# Patient Record
Sex: MALE | Race: WHITE | NOT HISPANIC OR LATINO | URBAN - METROPOLITAN AREA
[De-identification: names, ages, dates, MRNs, and addresses within clinical notes are randomized per-mention and may not be internally consistent; named-entity substitution may affect disease eponyms.]

---

## 2019-06-05 ENCOUNTER — OFFICE (AMBULATORY)
Dept: URBAN - METROPOLITAN AREA PATHOLOGY 4 | Facility: PATHOLOGY | Age: 74
End: 2019-06-05

## 2019-06-05 ENCOUNTER — ON CAMPUS - OUTPATIENT (AMBULATORY)
Dept: URBAN - METROPOLITAN AREA HOSPITAL 2 | Facility: HOSPITAL | Age: 74
End: 2019-06-05
Payer: COMMERCIAL

## 2019-06-05 VITALS
WEIGHT: 283.4 LBS | DIASTOLIC BLOOD PRESSURE: 79 MMHG | DIASTOLIC BLOOD PRESSURE: 71 MMHG | OXYGEN SATURATION: 97 % | DIASTOLIC BLOOD PRESSURE: 82 MMHG | HEIGHT: 75 IN | SYSTOLIC BLOOD PRESSURE: 127 MMHG | SYSTOLIC BLOOD PRESSURE: 118 MMHG | DIASTOLIC BLOOD PRESSURE: 63 MMHG | HEART RATE: 54 BPM | OXYGEN SATURATION: 98 % | RESPIRATION RATE: 16 BRPM | OXYGEN SATURATION: 99 % | HEART RATE: 50 BPM | SYSTOLIC BLOOD PRESSURE: 146 MMHG | DIASTOLIC BLOOD PRESSURE: 106 MMHG | OXYGEN SATURATION: 95 % | DIASTOLIC BLOOD PRESSURE: 64 MMHG | SYSTOLIC BLOOD PRESSURE: 154 MMHG | HEART RATE: 52 BPM | HEART RATE: 65 BPM | SYSTOLIC BLOOD PRESSURE: 129 MMHG | SYSTOLIC BLOOD PRESSURE: 113 MMHG | SYSTOLIC BLOOD PRESSURE: 122 MMHG | HEART RATE: 62 BPM | TEMPERATURE: 97 F | SYSTOLIC BLOOD PRESSURE: 151 MMHG | DIASTOLIC BLOOD PRESSURE: 66 MMHG | DIASTOLIC BLOOD PRESSURE: 90 MMHG | HEART RATE: 47 BPM | OXYGEN SATURATION: 100 % | DIASTOLIC BLOOD PRESSURE: 68 MMHG | HEART RATE: 68 BPM | HEART RATE: 56 BPM | HEART RATE: 80 BPM | SYSTOLIC BLOOD PRESSURE: 111 MMHG | RESPIRATION RATE: 18 BRPM

## 2019-06-05 DIAGNOSIS — K64.1 SECOND DEGREE HEMORRHOIDS: ICD-10-CM

## 2019-06-05 DIAGNOSIS — K21.0 GASTRO-ESOPHAGEAL REFLUX DISEASE WITH ESOPHAGITIS: ICD-10-CM

## 2019-06-05 DIAGNOSIS — Z86.010 PERSONAL HISTORY OF COLONIC POLYPS: ICD-10-CM

## 2019-06-05 DIAGNOSIS — D12.2 BENIGN NEOPLASM OF ASCENDING COLON: ICD-10-CM

## 2019-06-05 DIAGNOSIS — K44.9 DIAPHRAGMATIC HERNIA WITHOUT OBSTRUCTION OR GANGRENE: ICD-10-CM

## 2019-06-05 DIAGNOSIS — R13.10 DYSPHAGIA, UNSPECIFIED: ICD-10-CM

## 2019-06-05 PROBLEM — K22.70 BARRETT'S ESOPHAGUS WITHOUT DYSPLASIA: Status: ACTIVE | Noted: 2019-06-05

## 2019-06-05 LAB
GI HISTOLOGY: A. SELECT: (no result)
GI HISTOLOGY: B. UNSPECIFIED: (no result)
GI HISTOLOGY: PDF REPORT: (no result)

## 2019-06-05 PROCEDURE — 43239 EGD BIOPSY SINGLE/MULTIPLE: CPT | Mod: 59 | Performed by: INTERNAL MEDICINE

## 2019-06-05 PROCEDURE — 45385 COLONOSCOPY W/LESION REMOVAL: CPT | Mod: PT | Performed by: INTERNAL MEDICINE

## 2019-06-05 PROCEDURE — 43450 DILATE ESOPHAGUS 1/MULT PASS: CPT | Performed by: INTERNAL MEDICINE

## 2019-06-05 PROCEDURE — 88305 TISSUE EXAM BY PATHOLOGIST: CPT | Performed by: INTERNAL MEDICINE

## 2019-06-05 RX ORDER — PANTOPRAZOLE SODIUM 40 MG/1
40 TABLET, DELAYED RELEASE ORAL
Qty: 90 | Refills: 3 | Status: ACTIVE
Start: 2019-06-05

## 2019-06-27 ENCOUNTER — OFFICE (AMBULATORY)
Dept: URBAN - METROPOLITAN AREA CLINIC 64 | Facility: CLINIC | Age: 74
End: 2019-06-27

## 2019-06-27 VITALS
SYSTOLIC BLOOD PRESSURE: 126 MMHG | HEIGHT: 75 IN | DIASTOLIC BLOOD PRESSURE: 66 MMHG | WEIGHT: 289 LBS | HEART RATE: 60 BPM

## 2019-06-27 DIAGNOSIS — R13.10 DYSPHAGIA, UNSPECIFIED: ICD-10-CM

## 2019-06-27 PROCEDURE — 99213 OFFICE O/P EST LOW 20 MIN: CPT | Performed by: NURSE PRACTITIONER

## 2020-01-27 ENCOUNTER — HOSPITAL ENCOUNTER (OUTPATIENT)
Dept: GENERAL RADIOLOGY | Facility: HOSPITAL | Age: 75
Discharge: HOME | End: 2020-01-27
Admitting: UROLOGY

## 2020-01-27 ENCOUNTER — TRANSCRIBE ORDERS (OUTPATIENT)
Dept: ADMINISTRATIVE | Facility: HOSPITAL | Age: 75
End: 2020-01-27

## 2020-01-27 DIAGNOSIS — N20.0 CALCULUS, RENAL: ICD-10-CM

## 2020-01-27 DIAGNOSIS — N20.0 CALCULUS, RENAL: Primary | ICD-10-CM

## 2020-01-27 PROCEDURE — 74018 RADEX ABDOMEN 1 VIEW: CPT

## 2024-01-26 ENCOUNTER — LAB (OUTPATIENT)
Dept: LAB | Facility: HOSPITAL | Age: 79
End: 2024-01-26
Payer: MEDICARE

## 2024-01-26 ENCOUNTER — TRANSCRIBE ORDERS (OUTPATIENT)
Dept: ADMINISTRATIVE | Facility: HOSPITAL | Age: 79
End: 2024-01-26
Payer: MEDICARE

## 2024-01-26 DIAGNOSIS — R73.09 ABNORMAL GLUCOSE: Primary | ICD-10-CM

## 2024-01-26 DIAGNOSIS — R73.09 ABNORMAL GLUCOSE: ICD-10-CM

## 2024-01-26 LAB — HBA1C MFR BLD: 9.6 % (ref 4.8–5.6)

## 2024-01-26 PROCEDURE — 83036 HEMOGLOBIN GLYCOSYLATED A1C: CPT

## 2024-01-26 PROCEDURE — 36415 COLL VENOUS BLD VENIPUNCTURE: CPT

## 2025-01-14 ENCOUNTER — PRE-ADMISSION TESTING (OUTPATIENT)
Dept: PREADMISSION TESTING | Facility: HOSPITAL | Age: 80
End: 2025-01-14
Payer: MEDICARE

## 2025-01-14 ENCOUNTER — HOSPITAL ENCOUNTER (OUTPATIENT)
Dept: GENERAL RADIOLOGY | Facility: HOSPITAL | Age: 80
Discharge: HOME | End: 2025-01-14
Payer: MEDICARE

## 2025-01-14 VITALS
SYSTOLIC BLOOD PRESSURE: 125 MMHG | HEIGHT: 73 IN | WEIGHT: 271 LBS | BODY MASS INDEX: 35.92 KG/M2 | HEART RATE: 81 BPM | DIASTOLIC BLOOD PRESSURE: 74 MMHG | TEMPERATURE: 97 F | OXYGEN SATURATION: 97 % | RESPIRATION RATE: 18 BRPM

## 2025-01-14 LAB
ABO GROUP BLD: NORMAL
ALBUMIN SERPL-MCNC: 4.3 G/DL (ref 3.5–5.2)
ALBUMIN/GLOB SERPL: 1.2 G/DL
ALP SERPL-CCNC: 111 U/L (ref 39–117)
ALT SERPL W P-5'-P-CCNC: 35 U/L (ref 1–41)
ANION GAP SERPL CALCULATED.3IONS-SCNC: 14.7 MMOL/L (ref 5–15)
AST SERPL-CCNC: 28 U/L (ref 1–40)
BILIRUB SERPL-MCNC: 0.4 MG/DL (ref 0–1.2)
BLD GP AB SCN SERPL QL: NEGATIVE
BUN SERPL-MCNC: 28 MG/DL (ref 8–23)
BUN/CREAT SERPL: 18.8 (ref 7–25)
CALCIUM SPEC-SCNC: 9.6 MG/DL (ref 8.6–10.5)
CHLORIDE SERPL-SCNC: 99 MMOL/L (ref 98–107)
CO2 SERPL-SCNC: 25.3 MMOL/L (ref 22–29)
CREAT SERPL-MCNC: 1.49 MG/DL (ref 0.76–1.27)
CRP SERPL-MCNC: 0.53 MG/DL (ref 0–0.5)
DEPRECATED RDW RBC AUTO: 40.3 FL (ref 37–54)
EGFRCR SERPLBLD CKD-EPI 2021: 47.4 ML/MIN/1.73
ERYTHROCYTE [DISTWIDTH] IN BLOOD BY AUTOMATED COUNT: 13 % (ref 12.3–15.4)
ERYTHROCYTE [SEDIMENTATION RATE] IN BLOOD: 35 MM/HR (ref 0–20)
GLOBULIN UR ELPH-MCNC: 3.5 GM/DL
GLUCOSE SERPL-MCNC: 241 MG/DL (ref 65–99)
HBA1C MFR BLD: 8.4 % (ref 4.8–5.6)
HCT VFR BLD AUTO: 43.9 % (ref 37.5–51)
HGB BLD-MCNC: 13.6 G/DL (ref 13–17.7)
INR PPP: 0.98 (ref 0.9–1.1)
MCH RBC QN AUTO: 26.3 PG (ref 26.6–33)
MCHC RBC AUTO-ENTMCNC: 31 G/DL (ref 31.5–35.7)
MCV RBC AUTO: 84.7 FL (ref 79–97)
PLATELET # BLD AUTO: 214 10*3/MM3 (ref 140–450)
PMV BLD AUTO: 12.2 FL (ref 6–12)
POTASSIUM SERPL-SCNC: 4.7 MMOL/L (ref 3.5–5.2)
PROT SERPL-MCNC: 7.8 G/DL (ref 6–8.5)
PROTHROMBIN TIME: 13.2 SECONDS (ref 11.7–14.2)
QT INTERVAL: 414 MS
QTC INTERVAL: 472 MS
RBC # BLD AUTO: 5.18 10*6/MM3 (ref 4.14–5.8)
RH BLD: POSITIVE
SODIUM SERPL-SCNC: 139 MMOL/L (ref 136–145)
T&S EXPIRATION DATE: NORMAL
WBC NRBC COR # BLD AUTO: 10.81 10*3/MM3 (ref 3.4–10.8)

## 2025-01-14 PROCEDURE — 93005 ELECTROCARDIOGRAM TRACING: CPT

## 2025-01-14 PROCEDURE — 73560 X-RAY EXAM OF KNEE 1 OR 2: CPT

## 2025-01-14 PROCEDURE — 86900 BLOOD TYPING SEROLOGIC ABO: CPT

## 2025-01-14 PROCEDURE — 86901 BLOOD TYPING SEROLOGIC RH(D): CPT

## 2025-01-14 PROCEDURE — 85610 PROTHROMBIN TIME: CPT

## 2025-01-14 PROCEDURE — 85652 RBC SED RATE AUTOMATED: CPT

## 2025-01-14 PROCEDURE — 83036 HEMOGLOBIN GLYCOSYLATED A1C: CPT

## 2025-01-14 PROCEDURE — 86140 C-REACTIVE PROTEIN: CPT

## 2025-01-14 PROCEDURE — 93010 ELECTROCARDIOGRAM REPORT: CPT | Performed by: INTERNAL MEDICINE

## 2025-01-14 PROCEDURE — 36415 COLL VENOUS BLD VENIPUNCTURE: CPT

## 2025-01-14 PROCEDURE — 86850 RBC ANTIBODY SCREEN: CPT

## 2025-01-14 PROCEDURE — 85027 COMPLETE CBC AUTOMATED: CPT

## 2025-01-14 PROCEDURE — 80053 COMPREHEN METABOLIC PANEL: CPT

## 2025-01-14 PROCEDURE — 71046 X-RAY EXAM CHEST 2 VIEWS: CPT

## 2025-01-14 RX ORDER — PANTOPRAZOLE SODIUM 40 MG/1
1 TABLET, DELAYED RELEASE ORAL DAILY
COMMUNITY
Start: 2024-12-16

## 2025-01-14 RX ORDER — METOPROLOL SUCCINATE 100 MG/1
0.5 TABLET, EXTENDED RELEASE ORAL DAILY
COMMUNITY
Start: 2020-01-01

## 2025-01-14 RX ORDER — LISINOPRIL 10 MG/1
0.5 TABLET ORAL NIGHTLY
COMMUNITY
Start: 2024-10-21

## 2025-01-14 RX ORDER — FEBUXOSTAT 40 MG/1
1 TABLET, FILM COATED ORAL EVERY OTHER DAY
COMMUNITY
Start: 2022-01-01

## 2025-01-14 RX ORDER — GLIMEPIRIDE 2 MG/1
1 TABLET ORAL
COMMUNITY
Start: 2020-01-01

## 2025-01-14 RX ORDER — ATORVASTATIN CALCIUM 20 MG/1
1 TABLET, FILM COATED ORAL DAILY
COMMUNITY
Start: 2024-10-28

## 2025-01-14 RX ORDER — GLIMEPIRIDE 2 MG/1
2 TABLET ORAL
COMMUNITY
Start: 2020-01-01

## 2025-01-14 RX ORDER — LEVOCETIRIZINE DIHYDROCHLORIDE 5 MG/1
1 TABLET, FILM COATED ORAL EVERY EVENING
COMMUNITY
Start: 2025-01-14

## 2025-01-14 RX ORDER — AZELASTINE HYDROCHLORIDE 137 UG/1
1 SPRAY, METERED NASAL EVERY MORNING
COMMUNITY
Start: 2025-01-14

## 2025-01-14 RX ORDER — FLUTICASONE PROPIONATE 50 MCG
2 SPRAY, SUSPENSION (ML) NASAL
COMMUNITY
Start: 2025-01-14

## 2025-01-14 NOTE — DISCHARGE INSTRUCTIONS
Take the following medications the morning of surgery:      PANTOPRAZOLE AND METOPROLOL    If you are on prescription narcotic pain medication to control your pain you may also take that medication the morning of surgery.      General Instructions:     Do not eat solid food after midnight the night before surgery.  Clear liquids day of surgery are allowed but must be stopped at least two hours before your hospital arrival time.       Allowed clear liquids      Water, sodas, and tea or coffee with no cream or milk added.       12 to 20 ounces of a clear liquid that contains carbohydrates is recommended.  If non-diabetic, have Gatorade or Powerade.  If diabetic, have G2 or Powerade Zero.     Do not have liquids red in color.  Do not consume chicken, beef, pork or vegetable broth or bouillon cubes of any variety as they are not considered clear liquids and are not allowed.      Infants may have breast milk up to four hours before surgery.  Infants drinking formula may drink formula up to six hours before surgery.   Patients who avoid smoking, chewing tobacco and alcohol for 4 weeks prior to surgery have a reduced risk of post-operative complications.  Quit smoking as many days before surgery as you can.  Do not smoke, use chewing tobacco or drink alcohol the day of surgery.   If applicable bring your C-PAP/ BI-PAP machine in with you to preop day of surgery.  Bring any papers given to you in the doctor’s office.  Wear clean comfortable clothes.  Do not wear contact lenses, false eyelashes or make-up.  Bring a case for your glasses.   Bring crutches or walker if applicable.  Remove all piercings.  Leave jewelry and any other valuables at home.  Hair extensions with metal clips must be removed prior to surgery.  The Pre-Admission Testing nurse will instruct you to bring medications if unable to obtain an accurate list in Pre-Admission Testing.        If you were given a blood bank ID arm band remember to bring it with  you the day of surgery.    Preventing a Surgical Site Infection:  For 2 to 3 days before surgery, avoid shaving with a razor because the razor can irritate skin and make it easier to develop an infection.    Any areas of open skin can increase the risk of a post-operative wound infection by allowing bacteria to enter and travel throughout the body.  Notify your surgeon if you have any skin wounds / rashes even if it is not near the expected surgical site.  The area will need assessed to determine if surgery should be delayed until it is healed.  The night prior to surgery shower using a fresh bar of anti-bacterial soap (such as Dial) and clean washcloth.  Sleep in a clean bed with clean clothing.  Do not allow pets to sleep with you.  Shower on the morning of surgery using a fresh bar of anti-bacterial soap (such as Dial) and clean washcloth.  Dry with a clean towel and dress in clean clothing.  Ask your surgeon if you will be receiving antibiotics prior to surgery.  Make sure you, your family, and all healthcare providers clean their hands with soap and water or an alcohol based hand  before caring for you or your wound.    Day of surgery:  Your arrival time is approximately two hours before your scheduled surgery time.  Please note if you have an early arrival time the surgery doors do not open before 5:00 AM.  Upon arrival, a Pre-op nurse and Anesthesiologist will review your health history, obtain vital signs, and answer questions you may have.  The only belongings needed at this time will be a list of your home medications and if applicable your C-PAP/BI-PAP machine.  A Pre-op nurse will start an IV and you may receive medication in preparation for surgery, including something to help you relax.     Please be aware that surgery does come with discomfort.  We want to make every effort to control your discomfort so please discuss any uncontrolled symptoms with your nurse.   Your doctor will most likely  have prescribed pain medications.      If you are going home after surgery you will receive individualized written care instructions before being discharged.  A responsible adult must drive you to and from the hospital on the day of your surgery and ideally stay with you through the night.   .  Discharge prescriptions can be filled by the hospital pharmacy during regular pharmacy hours.  If you are having surgery late in the day/evening your prescription may be e-prescribed to your pharmacy.  Please verify your pharmacy hours or chose a 24 hour pharmacy to avoid not having access to your prescription because your pharmacy has closed for the day.    If you are staying overnight following surgery, you will be transported to your hospital room following the recovery period.  [facility] has all private rooms.    If you have any questions please call Pre-Admission Testing at [phone].  Deductibles and co-payments are collected on the day of service. Please be prepared to pay the required co-pay, deductible or deposit on the day of service as defined by your plan.    Call your surgeon immediately if you experience any of the following symptoms:  Sore Throat  Shortness of Breath or difficulty breathing  Cough  Chills  Body soreness or muscle pain  Headache  Fever  New loss of taste or smell  Do not arrive for your surgery ill.  Your procedure will need to be rescheduled to another time.  You will need to call your physician before the day of surgery to avoid any unnecessary exposure to hospital staff as well as other patients.  CHLORHEXIDINE CLOTH INSTRUCTIONS  The morning of surgery follow these instructions using the Chlorhexidine cloths you've been given.  These steps reduce bacteria on the body.  Do not use the cloths near your eyes, ears mouth, genitalia or on open wounds.  Throw the cloths away after use but do not try to flush them down a toilet.      Open and remove one cloth at a time from the  package.    Leave the cloth unfolded and begin the bathing.  Massage the skin with the cloths using gentle pressure to remove bacteria.  Do not scrub harshly.   Follow the steps below with one 2% CHG cloth per area (6 total cloths).  One cloth for neck, shoulders and chest.  One cloth for both arms, hands, fingers and underarms (do underarms last).  One cloth for the abdomen followed by groin.  One cloth for right leg and foot including between the toes.  One cloth for left leg and foot including between the toes.  The last cloth is to be used for the back of the neck, back and buttocks.    Allow the CHG to air dry 3 minutes on the skin which will give it time to work and decrease the chance of irritation.  The skin may feel sticky until it is dry.  Do not rinse with water or any other liquid or you will lose the beneficial effects of the CHG.  If mild skin irritation occurs, do rinse the skin to remove the CHG.  Report this to the nurse at time of admission.  Do not apply lotions, creams, ointments, deodorants or perfumes after using the clothes. Dress in clean clothes before coming to the hospital.

## 2025-01-23 ENCOUNTER — HOSPITAL ENCOUNTER (INPATIENT)
Facility: HOSPITAL | Age: 80
LOS: 1 days | Discharge: HOME | End: 2025-01-24
Attending: ORTHOPAEDIC SURGERY | Admitting: ORTHOPAEDIC SURGERY
Payer: MEDICARE

## 2025-01-23 ENCOUNTER — APPOINTMENT (OUTPATIENT)
Dept: GENERAL RADIOLOGY | Facility: HOSPITAL | Age: 80
End: 2025-01-23
Payer: MEDICARE

## 2025-01-23 ENCOUNTER — ANESTHESIA (OUTPATIENT)
Dept: PERIOP | Facility: HOSPITAL | Age: 80
End: 2025-01-23
Payer: MEDICARE

## 2025-01-23 ENCOUNTER — ANESTHESIA EVENT (OUTPATIENT)
Dept: PERIOP | Facility: HOSPITAL | Age: 80
End: 2025-01-23
Payer: MEDICARE

## 2025-01-23 PROBLEM — Z96.659 STATUS POST KNEE REPLACEMENT: Status: ACTIVE | Noted: 2025-01-23

## 2025-01-23 LAB
GLUCOSE BLDC GLUCOMTR-MCNC: 157 MG/DL (ref 70–130)
GLUCOSE BLDC GLUCOMTR-MCNC: 169 MG/DL (ref 70–130)

## 2025-01-23 PROCEDURE — 25810000003 SODIUM CHLORIDE 0.9 % SOLUTION: Performed by: ORTHOPAEDIC SURGERY

## 2025-01-23 PROCEDURE — 25010000002 ONDANSETRON PER 1 MG: Performed by: NURSE ANESTHETIST, CERTIFIED REGISTERED

## 2025-01-23 PROCEDURE — 25010000002 ROPIVACAINE PER 1 MG: Performed by: ORTHOPAEDIC SURGERY

## 2025-01-23 PROCEDURE — 25010000002 LIDOCAINE PF 2% 2 % SOLUTION: Performed by: NURSE ANESTHETIST, CERTIFIED REGISTERED

## 2025-01-23 PROCEDURE — 25010000002 FENTANYL CITRATE (PF) 50 MCG/ML SOLUTION: Performed by: NURSE ANESTHETIST, CERTIFIED REGISTERED

## 2025-01-23 PROCEDURE — 25010000002 VANCOMYCIN 5 G RECONSTITUTED SOLUTION: Performed by: ORTHOPAEDIC SURGERY

## 2025-01-23 PROCEDURE — C1776 JOINT DEVICE (IMPLANTABLE): HCPCS | Performed by: ORTHOPAEDIC SURGERY

## 2025-01-23 PROCEDURE — 25010000002 SUGAMMADEX 200 MG/2ML SOLUTION: Performed by: NURSE ANESTHETIST, CERTIFIED REGISTERED

## 2025-01-23 PROCEDURE — 25010000002 CLINDAMYCIN 900 MG/50ML SOLUTION: Performed by: ORTHOPAEDIC SURGERY

## 2025-01-23 PROCEDURE — 25010000002 VANCOMYCIN 1 G RECONSTITUTED SOLUTION: Performed by: ORTHOPAEDIC SURGERY

## 2025-01-23 PROCEDURE — 82948 REAGENT STRIP/BLOOD GLUCOSE: CPT

## 2025-01-23 PROCEDURE — 0SRD069 REPLACEMENT OF LEFT KNEE JOINT WITH OXIDIZED ZIRCONIUM ON POLYETHYLENE SYNTHETIC SUBSTITUTE, CEMENTED, OPEN APPROACH: ICD-10-PCS | Performed by: ORTHOPAEDIC SURGERY

## 2025-01-23 PROCEDURE — 25010000002 DEXAMETHASONE SODIUM PHOSPHATE 20 MG/5ML SOLUTION: Performed by: NURSE ANESTHETIST, CERTIFIED REGISTERED

## 2025-01-23 PROCEDURE — 25010000002 CLONIDINE PER 1 MG: Performed by: ORTHOPAEDIC SURGERY

## 2025-01-23 PROCEDURE — 0SPD0JZ REMOVAL OF SYNTHETIC SUBSTITUTE FROM LEFT KNEE JOINT, OPEN APPROACH: ICD-10-PCS | Performed by: ORTHOPAEDIC SURGERY

## 2025-01-23 PROCEDURE — 25010000002 CEFAZOLIN PER 500 MG: Performed by: ORTHOPAEDIC SURGERY

## 2025-01-23 PROCEDURE — 73560 X-RAY EXAM OF KNEE 1 OR 2: CPT

## 2025-01-23 PROCEDURE — 25010000002 MAGNESIUM SULFATE PER 500 MG OF MAGNESIUM: Performed by: NURSE ANESTHETIST, CERTIFIED REGISTERED

## 2025-01-23 PROCEDURE — 25010000002 HYDROMORPHONE 1 MG/ML SOLUTION: Performed by: NURSE ANESTHETIST, CERTIFIED REGISTERED

## 2025-01-23 PROCEDURE — 25010000002 EPINEPHRINE 1 MG/ML SOLUTION 30 ML VIAL: Performed by: ORTHOPAEDIC SURGERY

## 2025-01-23 PROCEDURE — 25010000002 HYDROMORPHONE PER 4 MG: Performed by: NURSE ANESTHETIST, CERTIFIED REGISTERED

## 2025-01-23 PROCEDURE — 25810000003 LACTATED RINGERS PER 1000 ML: Performed by: STUDENT IN AN ORGANIZED HEALTH CARE EDUCATION/TRAINING PROGRAM

## 2025-01-23 PROCEDURE — C1713 ANCHOR/SCREW BN/BN,TIS/BN: HCPCS | Performed by: ORTHOPAEDIC SURGERY

## 2025-01-23 PROCEDURE — 25010000002 PROPOFOL 200 MG/20ML EMULSION: Performed by: NURSE ANESTHETIST, CERTIFIED REGISTERED

## 2025-01-23 DEVICE — VIOLET ANTIBACTERIAL POLYDIOXANONE, KNOTLESS TISSUE CONTROL DEVICE
Type: IMPLANTABLE DEVICE | Site: KNEE | Status: FUNCTIONAL
Brand: STRATAFIX

## 2025-01-23 DEVICE — JOURNEY II BCS XLPE ARTICULAR                                    INSERT SIZE 5-6 LEFT 10MM
Type: IMPLANTABLE DEVICE | Site: KNEE | Status: FUNCTIONAL
Brand: JOURNEY

## 2025-01-23 DEVICE — JOURNEY 7.5 ROUND RESURF PAT 35MM STANDARD
Type: IMPLANTABLE DEVICE | Site: KNEE | Status: FUNCTIONAL
Brand: JOURNEY

## 2025-01-23 DEVICE — KNOTLESS TISSUE CONTROL DEVICE, UNDYED UNIDIRECTIONAL (ANTIBACTERIAL) SYNTHETIC ABSORBABLE DEVICE
Type: IMPLANTABLE DEVICE | Site: KNEE | Status: FUNCTIONAL
Brand: STRATAFIX

## 2025-01-23 DEVICE — LEGION REVISION / HINGE HEMI                                    STEPPED TIBIAL WEDGE SIZE 5-6 LEFT                                    LATERAL/RIGHT MEDIAL 5MM
Type: IMPLANTABLE DEVICE | Site: KNEE | Status: FUNCTIONAL
Brand: LEGION

## 2025-01-23 DEVICE — LEGION RK/HK HEMI SZ 5-6 LTMD/RTLA 5MM
Type: IMPLANTABLE DEVICE | Site: KNEE | Status: FUNCTIONAL
Brand: LEGION

## 2025-01-23 DEVICE — PALACOS® R IS A FAST-CURING, RADIOPAQUE, POLY(METHYL METHACRYLATE)-BASED BONE CEMENT.PALACOS ® R CONTAINS THE X-RAY CONTRAST MEDIUM ZIRCONIUM DIOXIDE. TO IMPROVE VISIBILITY IN THE SURGICAL FIELD PALACOS ® R HAS BEEN COLOURED WITH CHLOROPHYLL (E141). THE BONE CEMENT IS PREPARED DIRECTLY BEFORE USE BY MIXING A POLYMER POWDER COMPONENT WITH A LIQUID MONOMER COMPONENT. A DUCTILE DOUGH FORMS WHICH CURES WITHIN A FEW MINUTES.
Type: IMPLANTABLE DEVICE | Site: KNEE | Status: FUNCTIONAL
Brand: PALACOS®

## 2025-01-23 DEVICE — LEGION TIB  BASE W/JOURNEY LOCK                                    DETAIL SZ 5 LT
Type: IMPLANTABLE DEVICE | Site: KNEE | Status: FUNCTIONAL
Brand: LEGION

## 2025-01-23 DEVICE — LEGION PRESSFIT STEM 12MM X 120MM
Type: IMPLANTABLE DEVICE | Site: KNEE | Status: FUNCTIONAL
Brand: LEGION

## 2025-01-23 DEVICE — JOURNEY II BCS FEMORAL OXINIUM                                    LEFT SIZE 5
Type: IMPLANTABLE DEVICE | Site: KNEE | Status: FUNCTIONAL
Brand: JOURNEY

## 2025-01-23 RX ORDER — MELOXICAM 15 MG/1
15 TABLET ORAL DAILY
Status: DISCONTINUED | OUTPATIENT
Start: 2025-01-23 | End: 2025-01-24 | Stop reason: HOSPADM

## 2025-01-23 RX ORDER — ROCURONIUM BROMIDE 10 MG/ML
INJECTION, SOLUTION INTRAVENOUS AS NEEDED
Status: DISCONTINUED | OUTPATIENT
Start: 2025-01-23 | End: 2025-01-23 | Stop reason: SURG

## 2025-01-23 RX ORDER — HYDROCODONE BITARTRATE AND ACETAMINOPHEN 5; 325 MG/1; MG/1
1 TABLET ORAL ONCE AS NEEDED
Status: DISCONTINUED | OUTPATIENT
Start: 2025-01-23 | End: 2025-01-23 | Stop reason: HOSPADM

## 2025-01-23 RX ORDER — IPRATROPIUM BROMIDE AND ALBUTEROL SULFATE 2.5; .5 MG/3ML; MG/3ML
3 SOLUTION RESPIRATORY (INHALATION) ONCE AS NEEDED
Status: DISCONTINUED | OUTPATIENT
Start: 2025-01-23 | End: 2025-01-23 | Stop reason: HOSPADM

## 2025-01-23 RX ORDER — PROMETHAZINE HYDROCHLORIDE 25 MG/1
25 SUPPOSITORY RECTAL ONCE AS NEEDED
Status: DISCONTINUED | OUTPATIENT
Start: 2025-01-23 | End: 2025-01-23 | Stop reason: HOSPADM

## 2025-01-23 RX ORDER — PROMETHAZINE HYDROCHLORIDE 25 MG/1
25 TABLET ORAL ONCE AS NEEDED
Status: DISCONTINUED | OUTPATIENT
Start: 2025-01-23 | End: 2025-01-23 | Stop reason: HOSPADM

## 2025-01-23 RX ORDER — LIDOCAINE HYDROCHLORIDE 20 MG/ML
INJECTION, SOLUTION EPIDURAL; INFILTRATION; INTRACAUDAL; PERINEURAL AS NEEDED
Status: DISCONTINUED | OUTPATIENT
Start: 2025-01-23 | End: 2025-01-23 | Stop reason: SURG

## 2025-01-23 RX ORDER — FENTANYL CITRATE 50 UG/ML
25 INJECTION, SOLUTION INTRAMUSCULAR; INTRAVENOUS
Status: DISCONTINUED | OUTPATIENT
Start: 2025-01-23 | End: 2025-01-23 | Stop reason: HOSPADM

## 2025-01-23 RX ORDER — NALOXONE HCL 0.4 MG/ML
0.2 VIAL (ML) INJECTION AS NEEDED
Status: DISCONTINUED | OUTPATIENT
Start: 2025-01-23 | End: 2025-01-23 | Stop reason: HOSPADM

## 2025-01-23 RX ORDER — ACETAMINOPHEN 500 MG
1000 TABLET ORAL EVERY 6 HOURS
Status: DISCONTINUED | OUTPATIENT
Start: 2025-01-23 | End: 2025-01-24 | Stop reason: HOSPADM

## 2025-01-23 RX ORDER — OXYCODONE HYDROCHLORIDE 5 MG/1
10 TABLET ORAL EVERY 4 HOURS PRN
Status: DISCONTINUED | OUTPATIENT
Start: 2025-01-23 | End: 2025-01-24 | Stop reason: HOSPADM

## 2025-01-23 RX ORDER — DIPHENHYDRAMINE HCL 25 MG
50 CAPSULE ORAL EVERY 6 HOURS PRN
Status: DISCONTINUED | OUTPATIENT
Start: 2025-01-23 | End: 2025-01-24 | Stop reason: HOSPADM

## 2025-01-23 RX ORDER — DEXAMETHASONE SODIUM PHOSPHATE 4 MG/ML
INJECTION, SOLUTION INTRA-ARTICULAR; INTRALESIONAL; INTRAMUSCULAR; INTRAVENOUS; SOFT TISSUE AS NEEDED
Status: DISCONTINUED | OUTPATIENT
Start: 2025-01-23 | End: 2025-01-23 | Stop reason: SURG

## 2025-01-23 RX ORDER — SODIUM CHLORIDE, SODIUM LACTATE, POTASSIUM CHLORIDE, CALCIUM CHLORIDE 600; 310; 30; 20 MG/100ML; MG/100ML; MG/100ML; MG/100ML
9 INJECTION, SOLUTION INTRAVENOUS CONTINUOUS
Status: ACTIVE | OUTPATIENT
Start: 2025-01-23 | End: 2025-01-24

## 2025-01-23 RX ORDER — OXYCODONE HYDROCHLORIDE 5 MG/1
5 TABLET ORAL EVERY 4 HOURS PRN
Status: DISCONTINUED | OUTPATIENT
Start: 2025-01-23 | End: 2025-01-24 | Stop reason: HOSPADM

## 2025-01-23 RX ORDER — EPHEDRINE SULFATE 50 MG/ML
5 INJECTION, SOLUTION INTRAVENOUS ONCE AS NEEDED
Status: DISCONTINUED | OUTPATIENT
Start: 2025-01-23 | End: 2025-01-23 | Stop reason: HOSPADM

## 2025-01-23 RX ORDER — ONDANSETRON 2 MG/ML
INJECTION INTRAMUSCULAR; INTRAVENOUS AS NEEDED
Status: DISCONTINUED | OUTPATIENT
Start: 2025-01-23 | End: 2025-01-23 | Stop reason: SURG

## 2025-01-23 RX ORDER — SODIUM CHLORIDE 0.9 % (FLUSH) 0.9 %
3-10 SYRINGE (ML) INJECTION AS NEEDED
Status: DISCONTINUED | OUTPATIENT
Start: 2025-01-23 | End: 2025-01-23 | Stop reason: HOSPADM

## 2025-01-23 RX ORDER — LISINOPRIL 10 MG/1
5 TABLET ORAL NIGHTLY
Status: DISCONTINUED | OUTPATIENT
Start: 2025-01-23 | End: 2025-01-24 | Stop reason: HOSPADM

## 2025-01-23 RX ORDER — DIPHENHYDRAMINE HYDROCHLORIDE 50 MG/ML
25 INJECTION INTRAMUSCULAR; INTRAVENOUS EVERY 6 HOURS PRN
Status: DISCONTINUED | OUTPATIENT
Start: 2025-01-23 | End: 2025-01-24 | Stop reason: HOSPADM

## 2025-01-23 RX ORDER — MAGNESIUM SULFATE HEPTAHYDRATE 500 MG/ML
INJECTION, SOLUTION INTRAMUSCULAR; INTRAVENOUS AS NEEDED
Status: DISCONTINUED | OUTPATIENT
Start: 2025-01-23 | End: 2025-01-23 | Stop reason: SURG

## 2025-01-23 RX ORDER — METOPROLOL SUCCINATE 50 MG/1
50 TABLET, EXTENDED RELEASE ORAL DAILY
Status: DISCONTINUED | OUTPATIENT
Start: 2025-01-24 | End: 2025-01-24 | Stop reason: HOSPADM

## 2025-01-23 RX ORDER — FLUMAZENIL 0.1 MG/ML
0.2 INJECTION INTRAVENOUS AS NEEDED
Status: DISCONTINUED | OUTPATIENT
Start: 2025-01-23 | End: 2025-01-23 | Stop reason: HOSPADM

## 2025-01-23 RX ORDER — KETAMINE HCL IN NACL, ISO-OSM 100MG/10ML
SYRINGE (ML) INJECTION AS NEEDED
Status: DISCONTINUED | OUTPATIENT
Start: 2025-01-23 | End: 2025-01-23 | Stop reason: SURG

## 2025-01-23 RX ORDER — MAGNESIUM HYDROXIDE 1200 MG/15ML
LIQUID ORAL AS NEEDED
Status: DISCONTINUED | OUTPATIENT
Start: 2025-01-23 | End: 2025-01-23 | Stop reason: HOSPADM

## 2025-01-23 RX ORDER — ACETAMINOPHEN 500 MG
1000 TABLET ORAL ONCE
Status: COMPLETED | OUTPATIENT
Start: 2025-01-23 | End: 2025-01-23

## 2025-01-23 RX ORDER — VANCOMYCIN/0.9 % SOD CHLORIDE 1.5G/250ML
1500 PLASTIC BAG, INJECTION (ML) INTRAVENOUS ONCE
Status: COMPLETED | OUTPATIENT
Start: 2025-01-23 | End: 2025-01-23

## 2025-01-23 RX ORDER — VANCOMYCIN HYDROCHLORIDE 1 G/20ML
INJECTION, POWDER, LYOPHILIZED, FOR SOLUTION INTRAVENOUS AS NEEDED
Status: DISCONTINUED | OUTPATIENT
Start: 2025-01-23 | End: 2025-01-23 | Stop reason: HOSPADM

## 2025-01-23 RX ORDER — LIDOCAINE HYDROCHLORIDE 10 MG/ML
0.5 INJECTION, SOLUTION INFILTRATION; PERINEURAL ONCE AS NEEDED
Status: DISCONTINUED | OUTPATIENT
Start: 2025-01-23 | End: 2025-01-23 | Stop reason: HOSPADM

## 2025-01-23 RX ORDER — LABETALOL HYDROCHLORIDE 5 MG/ML
5 INJECTION, SOLUTION INTRAVENOUS
Status: DISCONTINUED | OUTPATIENT
Start: 2025-01-23 | End: 2025-01-23 | Stop reason: HOSPADM

## 2025-01-23 RX ORDER — HYDROMORPHONE HYDROCHLORIDE 1 MG/ML
0.25 INJECTION, SOLUTION INTRAMUSCULAR; INTRAVENOUS; SUBCUTANEOUS
Status: DISCONTINUED | OUTPATIENT
Start: 2025-01-23 | End: 2025-01-23 | Stop reason: HOSPADM

## 2025-01-23 RX ORDER — NALOXONE HCL 0.4 MG/ML
0.1 VIAL (ML) INJECTION
Status: DISCONTINUED | OUTPATIENT
Start: 2025-01-23 | End: 2025-01-24 | Stop reason: HOSPADM

## 2025-01-23 RX ORDER — FAMOTIDINE 20 MG/1
40 TABLET, FILM COATED ORAL DAILY
Status: DISCONTINUED | OUTPATIENT
Start: 2025-01-23 | End: 2025-01-24 | Stop reason: HOSPADM

## 2025-01-23 RX ORDER — ONDANSETRON 2 MG/ML
4 INJECTION INTRAMUSCULAR; INTRAVENOUS ONCE AS NEEDED
Status: COMPLETED | OUTPATIENT
Start: 2025-01-23 | End: 2025-01-23

## 2025-01-23 RX ORDER — ATROPINE SULFATE 0.4 MG/ML
0.4 INJECTION, SOLUTION INTRAMUSCULAR; INTRAVENOUS; SUBCUTANEOUS ONCE AS NEEDED
Status: DISCONTINUED | OUTPATIENT
Start: 2025-01-23 | End: 2025-01-23 | Stop reason: HOSPADM

## 2025-01-23 RX ORDER — ONDANSETRON 4 MG/1
4 TABLET, ORALLY DISINTEGRATING ORAL EVERY 6 HOURS PRN
Status: DISCONTINUED | OUTPATIENT
Start: 2025-01-23 | End: 2025-01-24 | Stop reason: HOSPADM

## 2025-01-23 RX ORDER — HYDROCODONE BITARTRATE AND ACETAMINOPHEN 7.5; 325 MG/1; MG/1
1 TABLET ORAL EVERY 4 HOURS PRN
Status: DISCONTINUED | OUTPATIENT
Start: 2025-01-23 | End: 2025-01-23 | Stop reason: HOSPADM

## 2025-01-23 RX ORDER — SODIUM CHLORIDE 0.9 % (FLUSH) 0.9 %
3 SYRINGE (ML) INJECTION EVERY 12 HOURS SCHEDULED
Status: DISCONTINUED | OUTPATIENT
Start: 2025-01-23 | End: 2025-01-23 | Stop reason: HOSPADM

## 2025-01-23 RX ORDER — PROPOFOL 10 MG/ML
INJECTION, EMULSION INTRAVENOUS AS NEEDED
Status: DISCONTINUED | OUTPATIENT
Start: 2025-01-23 | End: 2025-01-23 | Stop reason: SURG

## 2025-01-23 RX ORDER — DOCUSATE SODIUM 100 MG/1
100 CAPSULE, LIQUID FILLED ORAL 2 TIMES DAILY PRN
Status: DISCONTINUED | OUTPATIENT
Start: 2025-01-23 | End: 2025-01-24 | Stop reason: HOSPADM

## 2025-01-23 RX ORDER — HYDRALAZINE HYDROCHLORIDE 20 MG/ML
5 INJECTION INTRAMUSCULAR; INTRAVENOUS
Status: DISCONTINUED | OUTPATIENT
Start: 2025-01-23 | End: 2025-01-23 | Stop reason: HOSPADM

## 2025-01-23 RX ORDER — DIPHENHYDRAMINE HYDROCHLORIDE 50 MG/ML
12.5 INJECTION INTRAMUSCULAR; INTRAVENOUS
Status: DISCONTINUED | OUTPATIENT
Start: 2025-01-23 | End: 2025-01-23 | Stop reason: HOSPADM

## 2025-01-23 RX ORDER — CLINDAMYCIN PHOSPHATE 900 MG/50ML
900 INJECTION, SOLUTION INTRAVENOUS ONCE
Status: COMPLETED | OUTPATIENT
Start: 2025-01-23 | End: 2025-01-23

## 2025-01-23 RX ORDER — ASPIRIN 81 MG/1
81 TABLET ORAL EVERY 12 HOURS SCHEDULED
Status: DISCONTINUED | OUTPATIENT
Start: 2025-01-24 | End: 2025-01-24 | Stop reason: HOSPADM

## 2025-01-23 RX ORDER — TRANEXAMIC ACID 100 MG/ML
INJECTION, SOLUTION INTRAVENOUS AS NEEDED
Status: DISCONTINUED | OUTPATIENT
Start: 2025-01-23 | End: 2025-01-23 | Stop reason: SURG

## 2025-01-23 RX ORDER — ONDANSETRON 2 MG/ML
4 INJECTION INTRAMUSCULAR; INTRAVENOUS EVERY 6 HOURS PRN
Status: DISCONTINUED | OUTPATIENT
Start: 2025-01-23 | End: 2025-01-24 | Stop reason: HOSPADM

## 2025-01-23 RX ADMIN — ROCURONIUM BROMIDE 100 MG: 10 INJECTION, SOLUTION INTRAVENOUS at 12:37

## 2025-01-23 RX ADMIN — VANCOMYCIN HYDROCHLORIDE 1500 MG: 500 INJECTION, POWDER, LYOPHILIZED, FOR SOLUTION INTRAVENOUS at 11:32

## 2025-01-23 RX ADMIN — OXYCODONE HYDROCHLORIDE 10 MG: 5 TABLET ORAL at 21:59

## 2025-01-23 RX ADMIN — LISINOPRIL 5 MG: 10 TABLET ORAL at 20:29

## 2025-01-23 RX ADMIN — SUGAMMADEX 300 MG: 100 INJECTION, SOLUTION INTRAVENOUS at 13:47

## 2025-01-23 RX ADMIN — ACETAMINOPHEN 1000 MG: 500 TABLET, FILM COATED ORAL at 23:58

## 2025-01-23 RX ADMIN — HYDROMORPHONE HYDROCHLORIDE 0.25 MG: 1 INJECTION, SOLUTION INTRAMUSCULAR; INTRAVENOUS; SUBCUTANEOUS at 14:40

## 2025-01-23 RX ADMIN — ACETAMINOPHEN 1000 MG: 500 TABLET, FILM COATED ORAL at 11:26

## 2025-01-23 RX ADMIN — HYDROMORPHONE HYDROCHLORIDE 0.5 MG: 1 INJECTION, SOLUTION INTRAMUSCULAR; INTRAVENOUS; SUBCUTANEOUS at 13:52

## 2025-01-23 RX ADMIN — HYDROMORPHONE HYDROCHLORIDE 0.25 MG: 1 INJECTION, SOLUTION INTRAMUSCULAR; INTRAVENOUS; SUBCUTANEOUS at 14:20

## 2025-01-23 RX ADMIN — SODIUM CHLORIDE 2000 MG: 900 INJECTION INTRAVENOUS at 17:53

## 2025-01-23 RX ADMIN — ONDANSETRON 4 MG: 2 INJECTION INTRAMUSCULAR; INTRAVENOUS at 12:55

## 2025-01-23 RX ADMIN — HYDROCODONE BITARTRATE AND ACETAMINOPHEN 1 TABLET: 7.5; 325 TABLET ORAL at 14:20

## 2025-01-23 RX ADMIN — SODIUM CHLORIDE 2000 MG: 900 INJECTION INTRAVENOUS at 23:58

## 2025-01-23 RX ADMIN — MAGNESIUM SULFATE HEPTAHYDRATE 1 G: 500 INJECTION, SOLUTION INTRAMUSCULAR; INTRAVENOUS at 12:44

## 2025-01-23 RX ADMIN — Medication 3 ML: at 12:23

## 2025-01-23 RX ADMIN — LIDOCAINE HYDROCHLORIDE 100 MG: 20 INJECTION, SOLUTION EPIDURAL; INFILTRATION; INTRACAUDAL; PERINEURAL at 12:36

## 2025-01-23 RX ADMIN — PROPOFOL 250 MG: 10 INJECTION, EMULSION INTRAVENOUS at 12:37

## 2025-01-23 RX ADMIN — HYDROMORPHONE HYDROCHLORIDE 0.25 MG: 1 INJECTION, SOLUTION INTRAMUSCULAR; INTRAVENOUS; SUBCUTANEOUS at 14:15

## 2025-01-23 RX ADMIN — FENTANYL CITRATE 25 MCG: 50 INJECTION, SOLUTION INTRAMUSCULAR; INTRAVENOUS at 14:00

## 2025-01-23 RX ADMIN — Medication 50 MG: at 12:52

## 2025-01-23 RX ADMIN — MAGNESIUM SULFATE HEPTAHYDRATE 1 G: 500 INJECTION, SOLUTION INTRAMUSCULAR; INTRAVENOUS at 13:40

## 2025-01-23 RX ADMIN — TRANEXAMIC ACID 1000 MG: 100 INJECTION, SOLUTION INTRAVENOUS at 12:48

## 2025-01-23 RX ADMIN — ACETAMINOPHEN 1000 MG: 500 TABLET, FILM COATED ORAL at 17:52

## 2025-01-23 RX ADMIN — FAMOTIDINE 40 MG: 20 TABLET, FILM COATED ORAL at 17:52

## 2025-01-23 RX ADMIN — OXYCODONE HYDROCHLORIDE 5 MG: 5 TABLET ORAL at 17:52

## 2025-01-23 RX ADMIN — HYDROMORPHONE HYDROCHLORIDE 0.25 MG: 1 INJECTION, SOLUTION INTRAMUSCULAR; INTRAVENOUS; SUBCUTANEOUS at 14:45

## 2025-01-23 RX ADMIN — ONDANSETRON 4 MG: 2 INJECTION, SOLUTION INTRAMUSCULAR; INTRAVENOUS at 15:28

## 2025-01-23 RX ADMIN — DEXAMETHASONE SODIUM PHOSPHATE 10 MG: 4 INJECTION, SOLUTION INTRAMUSCULAR; INTRAVENOUS at 12:55

## 2025-01-23 RX ADMIN — FENTANYL CITRATE 25 MCG: 50 INJECTION, SOLUTION INTRAMUSCULAR; INTRAVENOUS at 14:05

## 2025-01-23 RX ADMIN — CLINDAMYCIN PHOSPHATE 900 MG: 900 INJECTION, SOLUTION INTRAVENOUS at 12:23

## 2025-01-23 RX ADMIN — MELOXICAM 15 MG: 15 TABLET ORAL at 17:52

## 2025-01-23 RX ADMIN — SODIUM CHLORIDE, SODIUM LACTATE, POTASSIUM CHLORIDE, CALCIUM CHLORIDE 9 ML/HR: 20; 30; 600; 310 INJECTION, SOLUTION INTRAVENOUS at 11:25

## 2025-01-23 NOTE — ANESTHESIA PROCEDURE NOTES
Airway  Urgency: elective    Date/Time: 1/23/2025 12:39 PM    General Information and Staff    Patient location during procedure: OR    Indications and Patient Condition  Indications for airway management: airway protection    Preoxygenated: yes  Mask difficulty assessment: 2 - vent by mask + OA or adjuvant +/- NMBA (10 cm oral airway)    Final Airway Details  Final airway type: endotracheal airway      Successful airway: ETT  Cuffed: yes   Successful intubation technique: video laryngoscopy  Endotracheal tube insertion site: oral  Blade: CMAC  Blade size: D  ETT size (mm): 7.0  Cormack-Lehane Classification: grade I - full view of glottis  Placement verified by: chest auscultation and capnometry   Measured from: lips  ETT/EBT  to lips (cm): 24  Assessment: lips, teeth, and gum same as pre-op and atraumatic intubation    Additional Comments  Pt does not extend neck. Elected to use CMAC due to limited cervical range of motion.

## 2025-01-23 NOTE — ANESTHESIA POSTPROCEDURE EVALUATION
Patient: Franco Hernandez    Procedure Summary       Date: 01/23/25 Room / Location:  [facility] [address] [place] [address] [facility]    Anesthesia Start: 1228 Anesthesia Stop: 1359    Procedure: TOTAL KNEE ARTHROPLASTY REVISION WITH CORI ROBOT (Left: Knee) Diagnosis:     Surgeons: Alfredo Torres II, MD Provider: Aashish Sharp MD    Anesthesia Type: general ASA Status: 3            Anesthesia Type: general    Vitals  Vitals Value Taken Time   /74 01/23/25 1500   Temp 36.3 °C (97.3 °F) 01/23/25 1400   Pulse 58 01/23/25 1510   Resp 17 01/23/25 1500   SpO2 98 % 01/23/25 1510   Vitals shown include unfiled device data.        Anesthesia Post Evaluation

## 2025-01-23 NOTE — H&P
Orthopaedic Surgery  History & Physical  Dr. SANTOSH Torres II  [phone]    HPI:  Patient is a 79 y.o. Not  or  male who presents with A failed left partial knee and is here today for revision surgery    MEDICAL HISTORY  Past Medical History:   Diagnosis Date   • Coronary artery disease    • Deviated septum    • Diabetes mellitus    • Dysphagia    • GERD (gastroesophageal reflux disease)    • Gout    • History of MI (myocardial infarction)    • Hypertension    • Kidney stone    • Sleep apnea     CPAP   • Stage 3 chronic kidney disease      Past Surgical History:   Procedure Laterality Date   • ANGIOPLASTY  2013   • APPENDECTOMY     • COLONOSCOPY     • CYSTOSCOPY INSERTION / REMOVAL STENT / STONE      2009, 2020   • ESOPHAGEAL DILATATION     • EXTRACORPOREAL SHOCK WAVE LITHOTRIPSY (ESWL)     • KIDNEY STONE SURGERY  2015   • NASAL POLYP SURGERY  1972   • PARTIAL KNEE ARTHROPLASTY Left 2008   • TOTAL KNEE ARTHROPLASTY Right 2011   • VEIN SURGERY Left 2010     Prior to Admission medications    Medication Sig Start Date End Date Taking? Authorizing Provider   ASPIRIN 81 PO Take 1 tablet by mouth Daily. TO HOLD 1 WEEK BEFORE SURGERY    ProviderJacoby MD   atorvastatin (LIPITOR) 20 MG tablet Take 1 tablet by mouth Daily. 10/28/24   Jacoby Foss MD   Azelastine HCl 137 MCG/SPRAY solution Administer 1 spray into the nostril(s) as directed by provider Every Morning.    Jacoby Foss MD   febuxostat (ULORIC) 40 MG tablet Take 1 tablet by mouth Every Other Day.    Jacoby Foss MD   fluticasone (FLONASE) 50 MCG/ACT nasal spray Administer 2 sprays into the nostril(s) as directed by provider Daily.    Jacoby Foss MD   glimepiride (AMARYL) 2 MG tablet Take 2 tablets by mouth Every Morning Before Breakfast.    Jacoby Foss MD   glimepiride (AMARYL) 2 MG tablet Take 1 tablet by mouth Daily With Dinner.    Jacoby Foss MD   levocetirizine (XYZAL) 5 MG  "tablet Take 1 tablet by mouth Every Evening.    Jacoby Foss MD   lisinopril (PRINIVIL,ZESTRIL) 10 MG tablet Take 0.5 tablets by mouth Every Night. HOLD JUST NIGHT BEFORE SURGERY ONLY 10/21/24   Jacoby Foss MD   metoprolol succinate XL (TOPROL-XL) 100 MG 24 hr tablet Take 0.5 tablets by mouth Daily.    Jacoby Foss MD   pantoprazole (PROTONIX) 40 MG EC tablet Take 1 tablet by mouth Daily. 12/16/24   Jacoby Foss MD   Semaglutide,0.25 or 0.5MG/DOS, (OZEMPIC) 2 MG/3ML solution pen-injector Inject 0.25 mg under the skin into the appropriate area as directed Every 7 (Seven) Days. TO HOLD 1 WEEK BEFORE SURGERY 11/26/24 10/28/25  Jacoby Foss MD     Allergies   Allergen Reactions   • Amoxicillin-Pot Clavulanate Swelling   • Cefdinir Unknown - Low Severity     Pt does not remember reaction   • Erythromycin Swelling     \"Everything\" swelled, remembers lips and scrotum.  ANYTHING IN THE FAMILY OF THIS MEDICINE   • Penicillins Other (See Comments)   • Sulfamethoxazole-Trimethoprim Other (See Comments)     Sores in mouth     Sores in mouth   • Tamsulosin Hives     Most Recent Immunizations   Administered Date(s) Administered   • Arexvy (RSV, Adults 60+ yrs) 06/25/2024   • COVID-19 (MODERNA) BIVALENT 12+YRS 12/15/2022   • COVID-19 (PFIZER) 12YRS+ (COMIRNATY) 09/12/2024   • COVID-19 (PFIZER) Purple Cap Monovalent 08/19/2022   • COVID-19 (UNSPECIFIED) 12/01/2023   • Fluzone High-Dose 65+YRS 09/12/2024   • Fluzone High-Dose 65+yrs 09/22/2020   • IPV 11/08/2012   • Influenza, Unspecified 12/01/2023   • Pneumococcal Conjugate 13-Valent (PCV13) 08/03/2015   • Pneumococcal Polysaccharide (PPSV23) 10/24/2013   • RSV MAB, Unspecified 06/25/2024   • Shingrix 02/03/2019   • TD Preservative Free (Tenivac) 06/17/2021   • Zostavax 08/06/2015     Social History   History  Tobacco Use  •  Smoking status:  Never  •  Smokeless tobacco:  Not on file  Substance Use Topics  •  Alcohol " "use:  Yes      Comment: SOCAIL       Social History   History  Substance and Sexual Activity  Drug Use  Never        REVIEW OF SYSTEMS:  Head: negative for headache  Respiratory: negative for shortness of breath.   Cardiovascular: negative for chest pain.   Gastrointestinal: negative abdominal pain.   Neurological: negative for LOC  Psychiatric/Behavioral: negative for memory loss.   All other systems reviewed and are negative    VITALS: There were no vitals taken for this visit. There is no height or weight on file to calculate BMI.    PHYSICAL EXAM:   CONSTITUTIONAL: A&Ox3, No acute distress  LUNGS: Equal chest rise, no shortness of air  CARDIOVASCULAR: palpable peripheral pulses  SKIN: no skin lesions in the area examined  LYMPH: no lymphadenopathy in the area examined  EXTREMITY: Left Lower Extremity  Tenderness to Palpation: No tenderness to palpation  Gross Deformity: No Gross Deformity  Pulses:  Palpable Doralis Pedis and Posterior Tibial Arteries  Sensation: Intact to Saphenous, Sural, Deep Peroneal, Superficial Peroneal, and Tibial Nerves and grossly throughout extremity  Motor: 5/5 EHL/FHL/TA/GS motor complexes    RADIOLOGY REVIEW:   No radiology results for the last 7 days    LABS:   Results for the past 24 hours:   Recent Results (from the past 24 hours)   POC Glucose Once    Collection Time: 01/23/25 10:49 AM    Specimen: Blood   Result Value Ref Range    Glucose 169 (H) 70 - 130 mg/dL       IMPRESSION:  Patient is a 79 y.o. Not  or  male with Failed left partial knee    PLAN:   Admited to: Alfredo Torres II, MD  Plan: Left total knee revision today    R \"Drake\" Melissa MUSTAFA MD  Orthopaedic Surgery  [facility]  [phone] - [place] Office  [phone] - [place] Office    "

## 2025-01-23 NOTE — ANESTHESIA PREPROCEDURE EVALUATION
Anesthesia Evaluation     Patient summary reviewed and Nursing notes reviewed   no history of anesthetic complications:   NPO Solid Status: > 8 hours  NPO Liquid Status: > 2 hours           Airway   Mallampati: II  TM distance: >3 FB  Neck ROM: full  Dental      Pulmonary    (+) ,sleep apnea  Cardiovascular     ECG reviewed    (+) hypertension, CAD      Neuro/Psych  GI/Hepatic/Renal/Endo    (+) obesity, GERD, renal disease- CRI, diabetes mellitus    Musculoskeletal     Abdominal    Substance History      OB/GYN          Other                          Anesthesia Plan    ASA 3     general     intravenous induction     Anesthetic plan, risks, benefits, and alternatives have been provided, discussed and informed consent has been obtained with: patient.        CODE STATUS:

## 2025-01-23 NOTE — OP NOTE
Total Knee Robotic Revision Operative Note  Dr. SANTOSH Segura” Melissa II  [phone]    PATIENT NAME: Franco Hernandez  MRN: [id number]  : 1945 AGE: 79 y.o. 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 This case would not have been possible without another set of skilled surgical hands for retraction, use of instrumentation, and general assistance.  This assistance was vital to the success of the case.   ESTIMATED BLOOD LOSS: 100cc  SPONGE AND NEEDLE COUNT: Correct  INDICATIONS: This patient was noted to have a painful partial knee with advanced arthritis in the lateral and patellofemoral compartment. A discussion of operative versus nonoperative treatment was had with the patient and they failed conservative management. They elected to undergo total knee arthroplasty revision. The risks of surgery were discussed and included the risk of anesthesia, infection, damage to neurovascular structures, implant loosening/failure, fracture, hardware prominence, continued pain, early failure, the need for further procedures, medical complications, and others. No guarantees were made. The patient wished to proceed with surgery and a surgical consent was signed.    COMPONENTS:   Standard 35 mm patellar resurfacing component  Journey 2 BCS size 5 left Oxinium BCS femoral component  Legion size 5 left revision tibial baseplate with two 5 mm Van wedges and a 12 x 120 short cemented stem  10 mm high flex articular insert    PERTINENT FINDINGS: Grossly loose femoral and tibial components with advanced lateral and patellofemoral  arthritis    DETAILS OF PROCEDURE:  The patient was met in the preoperative area. The site was marked. The consent and H&P were reviewed. The patient was then wheeled back to the operative suite and transferred to the operative table. The patient underwent anesthesia. A tourniquet was placed on the upper thigh.  A bump was placed under the operative hip. The Woods baseplate was secured to the table. Surgical alcohol was used to thoroughly clean the entire operative extremity.     The leg was then prepped in the normal sterile fashion, which included ChloraPrep, multiple layers of sterile drapes, and surgical space suits for the entire operative team. The Woods boot was applied to the foot after adequate padding. An Ioban dressing was applied to the knee after the surgical incision was marked.  New outer gloves were used by all sterile surgical team members after final draping. After a surgical timeout in which administration of preoperative antibiotics as well as 1g of tranexamic acid (if not contraindicated) and the surgical site were confirmed, the tourniquet was put up.     2 tibial and 2 femoral pins were placed for the robot apparatus and the robotic arrays were attached.    In flexion, a midline knee incision was utilized through the old scar from the prior surgery.  Dissection was carried down to the capsule.  Medial and lateral flaps were created to allow adequate exposure.  Next a medial parapatellar arthrotomy was made.       The patella was exposed and as the patella had not previously been resurfaced I made a cleanup cut and drilled the lug holes.  Extra bone was removed.  I then protected the patella for the rest the case.  An extra my attention to the robot.  The knee was mapped and planned in standard fashion.  I had a difficult time mapping the femoral component as a component was noted to be grossly loose but eventually I was able to adequately map it.  The polyethylene and femoral component  were removed quite easily.  I then turned my attention to the bur.  The distal femur was burred and the lug holes were made for the rotational array.  The 5 in 1 cutting block was then secured into those rotational holes and drilled into place.  The anterior posterior and chamfer cuts were made.  I then removed all extra bone and turned my attention to the tibia.  The tibial component came out quite easily with just a few taps of a mallet and osteotome.  I then measured the resection I decided that I would need to resect significantly more bone to get underneath the medial defect.  After finally making appropriate soft cut, the excess bone was removed and I finished the cut using the robotic bur.  Meniscus osteophytes in the PS box were then removed and the knee was trialed.  This required an 18 mm polyethylene with a standard journey 2 total knee.  Therefore, I elected to open a Legion revision baseplate which added 3 mm and add a 5 mm augment which would get us down to a 10 mm polyethylene.  This would allow for more precise trialing after cementing as I could go down to 9 it up to 11 or 12 instead of jumping by 3 or more millimeters with a larger polyethylene sizes.    Real implants were opened and cement was mixed.  I did prepare for a short cemented stem using the bur.  Canal restrictor was also utilized.  Cement was pressurized on the tibial canal and the tibial component was assembled and inserted first.  The femoral component was then inserted second and all excess cement was removed.  A trial polyethylene was utilized while cement hardened.  The knee was brought into extension and the patellar component was cemented.  The tourniquet was then taken down and adequate hemostasis was achieved.  The knee was injected with an analgesic cocktail.  After all cement was dry, the knee was trialed 1 final time and real polyethylene was opened and inserted.    The knee was then closed in layers and a sterile dressing  "was applied    The patient was awoken from anesthesia, moved to the [place] and taken to the recovery room in stable condition. Sponge and needle count were correct. There were no complications. Patient tolerated the procedure well.    R \"Drake\" Melissa MUSTAAF MD  Orthopaedic Surgery  [facility]  [phone]                "

## 2025-01-23 NOTE — DISCHARGE PLACEMENT REQUEST
"Yu Hernandez (79 y.o. Male)       Date of Birth   1945    Social Security Number       Address   [address] [place] [address] [place] [address]    Home Phone   [phone]    MRN   [id number]       Episcopalian   None    Marital Status                               Admission Date   1/23/25    Admission Type   Elective    Admitting Provider   Alfredo Torres II, MD    Attending Provider   Alfredo Torres II, MD    Department, Room/Bed   [address], P798/1       Discharge Date       Discharge Disposition       Discharge Destination                                 Attending Provider: Alfredo Torres II, MD    Allergies: Amoxicillin-pot Clavulanate, Cefdinir, Erythromycin, Penicillins, Sulfamethoxazole-trimethoprim, Tamsulosin    Isolation: None   Infection: None   Code Status: CPR    Ht: 185.4 cm (72.99\")   Wt: 123 kg (271 lb 2.7 oz)    Admission Cmt: None   Principal Problem: Status post knee replacement [Z96.659]                   Active Insurance as of 1/23/2025       Primary Coverage       Payor Plan Insurance Group Employer/Plan Group    ANTHEM MEDICARE REPLACEMENT ANTHEM MED ADV HMO [id number]       Payor Plan Address Payor Plan Phone Number Payor Plan Fax Number Effective Dates    [address] [phone]  1/1/2025 - None Entered    [facility] [id number]         Subscriber Name Subscriber Birth Date Member ID       YU HERNANDEZ 1945 [id number]                     Emergency Contacts        (Rel.) Home Phone Work Phone Mobile Phone    LORISTEVNA (Daughter) -- -- [phone]    NUHA HERNANDEZ (Spouse) -- -- [phone]              "

## 2025-01-24 ENCOUNTER — DOCUMENTATION (OUTPATIENT)
Dept: HOME HEALTH SERVICES | Facility: HOME HEALTHCARE | Age: 80
End: 2025-01-24
Payer: MEDICARE

## 2025-01-24 ENCOUNTER — HOME HEALTH ADMISSION (OUTPATIENT)
Dept: HOME HEALTH SERVICES | Facility: HOME HEALTHCARE | Age: 80
End: 2025-01-24
Payer: COMMERCIAL

## 2025-01-24 ENCOUNTER — TRANSCRIBE ORDERS (OUTPATIENT)
Dept: HOME HEALTH SERVICES | Facility: HOME HEALTHCARE | Age: 80
End: 2025-01-24
Payer: MEDICARE

## 2025-01-24 VITALS
BODY MASS INDEX: 35.94 KG/M2 | TEMPERATURE: 98.1 F | OXYGEN SATURATION: 93 % | HEIGHT: 73 IN | WEIGHT: 271.17 LBS | SYSTOLIC BLOOD PRESSURE: 110 MMHG | HEART RATE: 73 BPM | RESPIRATION RATE: 16 BRPM | DIASTOLIC BLOOD PRESSURE: 60 MMHG

## 2025-01-24 DIAGNOSIS — Z96.642 S/P TOTAL LEFT HIP ARTHROPLASTY: Primary | ICD-10-CM

## 2025-01-24 LAB
ANION GAP SERPL CALCULATED.3IONS-SCNC: 13 MMOL/L (ref 5–15)
BUN SERPL-MCNC: 23 MG/DL (ref 8–23)
BUN/CREAT SERPL: 17.3 (ref 7–25)
CALCIUM SPEC-SCNC: 8.3 MG/DL (ref 8.6–10.5)
CHLORIDE SERPL-SCNC: 100 MMOL/L (ref 98–107)
CO2 SERPL-SCNC: 20 MMOL/L (ref 22–29)
CREAT SERPL-MCNC: 1.33 MG/DL (ref 0.76–1.27)
DEPRECATED RDW RBC AUTO: 38.9 FL (ref 37–54)
EGFRCR SERPLBLD CKD-EPI 2021: 54.4 ML/MIN/1.73
ERYTHROCYTE [DISTWIDTH] IN BLOOD BY AUTOMATED COUNT: 12.9 % (ref 12.3–15.4)
GLUCOSE SERPL-MCNC: 244 MG/DL (ref 65–99)
HCT VFR BLD AUTO: 34.4 % (ref 37.5–51)
HGB BLD-MCNC: 11.3 G/DL (ref 13–17.7)
MCH RBC QN AUTO: 27.4 PG (ref 26.6–33)
MCHC RBC AUTO-ENTMCNC: 32.8 G/DL (ref 31.5–35.7)
MCV RBC AUTO: 83.5 FL (ref 79–97)
PLATELET # BLD AUTO: 171 10*3/MM3 (ref 140–450)
PMV BLD AUTO: 12.6 FL (ref 6–12)
POTASSIUM SERPL-SCNC: 5.2 MMOL/L (ref 3.5–5.2)
RBC # BLD AUTO: 4.12 10*6/MM3 (ref 4.14–5.8)
SODIUM SERPL-SCNC: 133 MMOL/L (ref 136–145)
WBC NRBC COR # BLD AUTO: 12.09 10*3/MM3 (ref 3.4–10.8)

## 2025-01-24 PROCEDURE — 85027 COMPLETE CBC AUTOMATED: CPT | Performed by: ORTHOPAEDIC SURGERY

## 2025-01-24 PROCEDURE — 80048 BASIC METABOLIC PNL TOTAL CA: CPT | Performed by: ORTHOPAEDIC SURGERY

## 2025-01-24 PROCEDURE — 97161 PT EVAL LOW COMPLEX 20 MIN: CPT

## 2025-01-24 PROCEDURE — 97530 THERAPEUTIC ACTIVITIES: CPT

## 2025-01-24 RX ORDER — ASPIRIN 81 MG/1
81 TABLET ORAL EVERY 12 HOURS
Qty: 60 TABLET | Refills: 0 | Status: SHIPPED | OUTPATIENT
Start: 2025-01-24 | End: 2025-02-23

## 2025-01-24 RX ORDER — ONDANSETRON 4 MG/1
4 TABLET, FILM COATED ORAL EVERY 6 HOURS PRN
Qty: 30 TABLET | Refills: 0 | Status: SHIPPED | OUTPATIENT
Start: 2025-01-24

## 2025-01-24 RX ORDER — HYDROCODONE BITARTRATE AND ACETAMINOPHEN 10; 325 MG/1; MG/1
1 TABLET ORAL EVERY 6 HOURS PRN
Qty: 50 TABLET | Refills: 0 | Status: SHIPPED | OUTPATIENT
Start: 2025-01-24

## 2025-01-24 RX ADMIN — ASPIRIN 81 MG: 81 TABLET, COATED ORAL at 08:51

## 2025-01-24 RX ADMIN — METOPROLOL SUCCINATE 50 MG: 50 TABLET, EXTENDED RELEASE ORAL at 08:51

## 2025-01-24 RX ADMIN — MELOXICAM 15 MG: 15 TABLET ORAL at 08:51

## 2025-01-24 RX ADMIN — FAMOTIDINE 40 MG: 20 TABLET, FILM COATED ORAL at 08:51

## 2025-01-24 NOTE — DISCHARGE INSTRUCTIONS
Total Knee  Discharge Instructions  Dr. SANTOSH Segura” Melissa II  [phone]    INCISION CARE  Wash your hands prior to dressing changes  JOSE Wound VAC: Postoperatively you had a JOSE Wound Vac placed on the incision. This was placed under sterile conditions in the operating room. It remains in place for 7 days postoperatively. After 7 days, the entire dressing must be removed, including all of the sticky adhesive. The dressing and battery pack provide gentle suction to the incision and provide several benefits over a traditional dressing:  It maintains the sterile environment of the OR and reduces the risk of infection  The suction removes unwanted buildup of blood/hematoma under the skin to reduce swelling  The suction also promotes fresh blood supply to the skin and soft tissue to speed up healing  The postoperative scar is reduced in size  Showering is permitted immediately after surgery, but the battery pack must be protected or removed during the shower.   After 7 days the JOSE Wound Vac is removed. If there is no drainage, no dressing is required. If there is some scant drainage a dry bandage can be applied and changed daily until seen in the office or until the drainage stops.   No creams or ointments to the incisions until 4 weeks post op.  Do not touch or pick at the incision  Check incision every day and notify surgeon immediately if any of the following signs or symptoms are seen:  Increase in redness  Increase in swelling around the incision and of the entire extremity  Increase in pain  NEW drainage or oozing from the incision  Pulling apart of the edges of the incision  Increase in overall body temperature (greater than 100.4 degrees)  Zip-Line: your incision was closed with a state of the art device.   Is a non-invasive and easy to use wound closure device that replaces sutures, staples and glue for surgical incisions  It minimizes scarring and eliminating “railroad” marks that come with staples or  sutures  It makes removal as atraumatic as peeling off a bandage  Can be removed at home or by a physical therapist or nurse at 14 days postoperatively    ACTIVITIES  Exercises:  Physical therapy will begin immediately while in the hospital. Patients going to a nursing home will get therapy as part of their care at the SNU/SNF facility. Patients going home may also have a therapist come to the house to help them mobilize until they can safely get to an outpatient therapy facility.  Elevate the affected leg most of the day during the first week post operatively. Caution must be taken to avoid pillow placement directly under the heel of the leg, as this can cause pressure ulcers even with a soft pillow. All pillows and blankets should be placed underneath of the thigh and calf so that the heel is free-floating.  Use cold packs for 20-30 minutes approximately 5 times per day.  You should perform the daily stretching and strengthening exercises as taught by the therapist as often as possible. This can be done many times a day.  Full weight bearing is allowed after surgery. It will be sore/painful to put weight on the leg, but this will help the bone to heal and prevent complications such as pneumonia, bed sores and blood clots. Mobilization is vital to the recovery process.  Activities of Daily Living:  No tub baths, hot tubs, or swimming pools for 4 weeks.  May shower and let water run over the incision immediately after surgery. The battery pack of the JOSE Wound Vac must be protected or removed while in the shower. After the JOSE is removed 7 days after surgery showering is permitted as long as there is no drainage from the wound.     Restrictions  Weight: It is ok to allow full weight bearing after surgery. Weight on the leg actually quickens the recovery process. While it will be sore/painful to put weight on the leg, it is safe to do so. Hip replacement after hip fracture has a much slower recover process. It can  take months to heal fully from a hip fracture and patients even make some slow benefits up to a year afterwards.   Driving: Many patients have questions about when it is safe to return to driving. The answer is that this is extremely variable. It depends on the extent of the surgery, as well as how quickly you heal. Certainly left leg surgeries make returning to driving easier while right leg surgeries require more extensive rehabilitation before driving can be safe. Until you can press down on the brake hard, and are off of all narcotics, driving is not permitted. Your surgeon cannot “clear” you to return to driving, only you can make the decision when you feel it is safe.    Medications  Anticoagulants: After upper extremity surgery most patients do not require an anticoagulant unless you have another injury that will be keeping you from mobilizing. Lower extremity surgery typically does require use of an anticoagulation medicine.   IF YOU HAD LOWER EXTREMITY SURGERY AND ARE NOT DISCHARGED HOME WITH ANY ANTICOAGULANT MEDICINE YOU SHOULD TAKE ASPIRIN 325mg DAILY FOR 30 DAYS POSTOPERATIVELY.  If you are discharged home with an anticoagulant such as Aspirin, Xarelto, Eliquis, Coumadin, or Lovenox, follow these simple instructions:   Notify surgeon immediately if any brittney bleeding is noted in the urine, stool, emesis, or from the nose or the incision. Blood in the stool will often appear as black rather than red. Blood in urine may appear as pink. Blood in emesis may appear as brown/black like coffee grounds.  You will need to apply pressure for longer periods of time to any cuts or abrasions to stop bleeding  Avoid alcohol while taking anticoagulants  Most anticoagulants are to be taken for 30 days postoperatively. After this time, you may stop using them unless instructed otherwise.   If you were already taking an anticoagulant (commonly Aspirin, Coumadin, or Plavix) you will likely be resuming your normal dose  postoperatively and will be continuing that medication at the discretion of the prescribing physician.  Stool Softeners: You will be at greater risk of constipation after surgery due to being less mobile and the pain medications.  Take stool softeners as needed. Over the counter Colace 100 mg 1-2 capsules twice daily can be taken.  If stools become too loose or too frequent, please decreases the dosage or stop the stool softener.  If constipation occurs despite use of stool softeners, you are to continue the stool softeners and add a laxative (Milk of Magnesia 1 ounce daily as needed)  Drink plenty of fluids, and eat fruits and vegetables during your recovery time. Getting up and mobilizing will help the bowels to recover their regular function, as will weaning off of all narcotics when the pain becomes tolerable.  Pain Medications: Utilized after surgery are narcotics. This is some general information about these medications.  CLASSIFICATION: Pain medications are called Opioids and are narcotics  LEGALITIES: It is illegal to share narcotics with others  DRIVING: it is illegal to drive while under the influence of narcotics. Doing so is a DUI.  POTENTIAL SIDE EFFECTS: nausea, vomiting, itching, dizziness, drowsiness, dry mouth, constipation, and difficulty urinating.  POTENTIAL ADVERSE EFFECTS:  Opioid tolerance can develop with use of pain medications and this simply means that it requires more and more of the medication to control pain. However, this is seen more in patients that use opioids for longer periods of time.  Opioid dependence can develop with use of Opioids. People with opioid dependence will experience withdrawal symptoms upon cessation of the medication.  Opioid addiction can develop with use of Opioids. The incidence of this is very unlikely in patients who take the medications as ordered and stop the medications as instructed.  Opioid overdose can be dangerous, but is unlikely when the medication  is taken as ordered and stopped when ordered. It is important not to mix opioids with alcohol as this can lead to over sedation and respiratory difficulty.  DOSAGE:  After the initial surgical pain begins to resolve, you may begin to decrease the pain medication. By the end of a few weeks, you should be off of pain medications.  Refills will not be given by the office during evening hours, on weekends, or after 6 weeks post-op. You are responsible for weaning off of pain medication. You can increase the time between narcotic pills, taking one every 4 then 6 then 8 hours and so on.  To seek refills on pain medications during the initial 6-week post-operative period, you must call the office to request the refill. The office will then notify you when to  the prescription. DO NOT wait until you are out of the medication to request a refill. Prescriptions will not be filled over the weekend and depending on the schedule, it may take a couple days for the prescription to be available. Someone will have to pick the prescription in person at the office.    FOLLOW-UP VISITS  You will need to follow up in the office with your surgeon in 3 weeks, or as instructed elsewhere in your discharge paperwork. Please call this number [phone] to schedule this appointment. If you are going to an SNF/SNU facility, they will arrange for you to follow up in the office.  If you have any concerns or suspected complications prior to your follow up visit, please call the office. Do not wait until your appointment time if you suspect complications. These will need to be addressed in the office promptly.      Alfredo Torres II, MD  Orthopaedic Surgery  [facility]

## 2025-01-24 NOTE — PLAN OF CARE
Goal Outcome Evaluation:   VSS. Dsg d/c/I.Sat on the side of bed to void this pm but was unable.Due  to void by 8 pm.Encouraged pt to ambulate however refused, states that he is feeling dizzy and weak.   Problem: Adult Inpatient Plan of Care  Goal: Plan of Care Review  Outcome: Progressing  Goal: Patient-Specific Goal (Individualized)  Outcome: Progressing  Goal: Absence of Hospital-Acquired Illness or Injury  Outcome: Progressing  Intervention: Identify and Manage Fall Risk  Recent Flowsheet Documentation  Taken 1/23/2025 1752 by Yuliaan Lainez RN  Safety Promotion/Fall Prevention: safety round/check completed  Taken 1/23/2025 1600 by Yuliana Lainez RN  Safety Promotion/Fall Prevention: safety round/check completed  Intervention: Prevent Skin Injury  Recent Flowsheet Documentation  Taken 1/23/2025 1752 by Yuliana Lainez RN  Body Position: sitting up in bed  Taken 1/23/2025 1600 by Yuliana Lainez RN  Body Position: supine  Intervention: Prevent and Manage VTE (Venous Thromboembolism) Risk  Recent Flowsheet Documentation  Taken 1/23/2025 1600 by Yuliana Lainez RN  VTE Prevention/Management: SCDs (sequential compression devices) on  Goal: Optimal Comfort and Wellbeing  Outcome: Progressing  Intervention: Monitor Pain and Promote Comfort  Recent Flowsheet Documentation  Taken 1/23/2025 1752 by Yuliana Lainez RN  Pain Management Interventions: pain medication given  Taken 1/23/2025 1600 by Yuliana Lainez RN  Pain Management Interventions:   cold applied   pillow support provided   position adjusted  Goal: Readiness for Transition of Care  Outcome: Progressing  Intervention: Mutually Develop Transition Plan  Recent Flowsheet Documentation  Taken 1/23/2025 1624 by Yuliana Lainez RN  Transportation Anticipated: family or friend will provide  Patient/Family Anticipated Services at Transition: none  Patient/Family Anticipates Transition to: home with family  Taken 1/23/2025 1623 by Migel  PIETRO Bridges  Equipment Currently Used at Home: cpap

## 2025-01-24 NOTE — PROGRESS NOTES
Patient is discharging today. Face sheet information is correct and only has cell on face sheet. Patient has no current home health and will be a patient at our [place] office. Will transcribe home health orders for PT per Dr Torres. Thank you !

## 2025-01-24 NOTE — PLAN OF CARE
Goal Outcome Evaluation:  Plan of Care Reviewed With: patient        Progress: improving  Outcome Evaluation: POD#1 Left Total Knee Revision. Acewrap in place. VSS. PO pain medication helping with pain. Ambulating with assistance. Voiding per urinal. Gait unstaedy. Education provided on pain contol and safety. Patient verbalized understanding. Possible d/c home today.

## 2025-01-24 NOTE — PLAN OF CARE
Goal Outcome Evaluation:  Plan of Care Reviewed With: patient           Outcome Evaluation: Patient is a 79 y.o male POD1 R TKA revision. Patient AOx4 supine in bed upon arrival. Patient lives with his spouse with 2 ABELARDO. Patient educated in TKA post op protocol. Patient sat up to EOB and ambulated 75ft in the hallway with rwx and SBA. Gait slow and mildly antalgic but steady with no overt LOB noted. Patient reclined in chair at end of session. Patient planning to d/c home today with assist and HHPT follow up. Acute PT will sign off.    Anticipated Discharge Disposition (PT): home with assist, home with home health

## 2025-01-24 NOTE — THERAPY EVALUATION
Patient Name: Franco Hernandez  : 1945    MRN: [id number]                              Today's Date: 2025       Admit Date: 2025    Visit Dx: No diagnosis found.  Patient Active Problem List   Diagnosis    Status post knee replacement     Past Medical History:   Diagnosis Date    Coronary artery disease     Deviated septum     Diabetes mellitus     Dysphagia     GERD (gastroesophageal reflux disease)     Gout     History of MI (myocardial infarction)     Hypertension     Kidney stone     Sleep apnea     CPAP    Stage 3 chronic kidney disease      Past Surgical History:   Procedure Laterality Date    ANGIOPLASTY      APPENDECTOMY      COLONOSCOPY      CYSTOSCOPY INSERTION / REMOVAL STENT / STONE      ,     ESOPHAGEAL DILATATION      EXTRACORPOREAL SHOCK WAVE LITHOTRIPSY (ESWL)      KIDNEY STONE SURGERY  2015    NASAL POLYP SURGERY  1972    PARTIAL KNEE ARTHROPLASTY Left 2008    TOTAL KNEE ARTHROPLASTY Right 2011    VEIN SURGERY Left 2010      General Information       Row Name 25 0959          Physical Therapy Time and Intention    Document Type evaluation  -     Mode of Treatment individual therapy;physical therapy  -       Row Name 25 0959          General Information    Patient Profile Reviewed yes  -SM     Prior Level of Function independent:  -     Existing Precautions/Restrictions fall  -       Row Name 25 0959          Living Environment    People in Home spouse  -       Row Name 25 0959          Home Main Entrance    Number of Stairs, Main Entrance one  -       Row Name 25 0959          Cognition    Orientation Status (Cognition) oriented x 3  -       Row Name 25 0959          Safety Issues/Impairments Affecting Functional Mobility    Impairments Affecting Function (Mobility) endurance/activity tolerance;range of motion (ROM);strength;pain  -               User Key  (r) = Recorded By, (t) = Taken By, (c) = Cosigned By       Initials Name Provider Type     Serenity Cheema PT Physical Therapist                   Mobility       Row Name 01/24/25 1000          Bed Mobility    Bed Mobility supine-sit  -     Supine-Sit [place] (Bed Mobility) modified independence  -     Comment, (Bed Mobility) UI at end of session  -       Row Name 01/24/25 1000          Sit-Stand Transfer    Sit-Stand [place] (Transfers) stand assist  -     Assistive Device (Sit-Stand Transfers) walker, front-wheeled  -       Row Name 01/24/25 1000          Gait/Stairs (Locomotion)    [place] Level (Gait) stand assist  -     Assistive Device (Gait) walker, front-wheeled  -     Distance in Feet (Gait) 75  -SM     Deviations/Abnormal Patterns (Gait) antalgic;gait speed decreased  -     Comment, (Gait/Stairs) Gait slow and mildly antalgic but steady with no overt LOB noted.  -               User Key  (r) = Recorded By, (t) = Taken By, (c) = Cosigned By      Initials Name Provider Type     Serenity Cheema PT Physical Therapist                   Obj/Interventions       Row Name 01/24/25 1003          Range of Motion Comprehensive    Comment, General Range of Motion L knee flexion 10-85 degrees  -       Row Name 01/24/25 1003          Strength Comprehensive (MMT)    General Manual Muscle Testing (MMT) Assessment lower extremity strength deficits identified  -     Comment, General Manual Muscle Testing (MMT) Assessment expected post op weakness  -       Row Name 01/24/25 1003          Motor Skills    Therapeutic Exercise knee  TKA post op protocol  -       Row Name 01/24/25 1003          Balance    Balance Assessment sitting static balance;sitting dynamic balance;standing static balance;standing dynamic balance  -     Static Sitting Balance independent  -     Dynamic Sitting Balance independent  -     Position, Sitting Balance sitting edge of bed  -     Static Standing Balance standby assist  -     Dynamic Standing  Balance standby assist  -     Position/Device Used, Standing Balance supported;walker, front-wheeled  -     Balance Interventions sitting;standing;sit to stand;supported;static;dynamic  -               User Key  (r) = Recorded By, (t) = Taken By, (c) = Cosigned By      Initials Name Provider Type     Serenity Cheema, PT Physical Therapist                   Goals/Plan    No documentation.                  Clinical Impression       Row Name 01/24/25 1004          Pain    Pretreatment Pain Rating 2/10  -SM     Posttreatment Pain Rating 2/10  -     Pain Location knee  -     Pain Side/Orientation left  -     Pain Management Interventions exercise or physical activity utilized  -     Response to Pain Interventions activity participation with tolerable pain  -       Row Name 01/24/25 1004          Plan of Care Review    Plan of Care Reviewed With patient  -     Outcome Evaluation Patient is a 79 y.o male POD1 R TKA revision. Patient AOx4 supine in bed upon arrival. Patient lives with his spouse with 2 ABELARDO. Patient educated in TKA post op protocol. Patient sat up to EOB and ambulated 75ft in the hallway with rwx and SBA. Gait slow and mildly antalgic but steady with no overt LOB noted. Patient reclined in chair at end of session. Patient planning to d/c home today with assist and HHPT follow up. Acute PT will sign off.  -       Row Name 01/24/25 1004          Therapy Assessment/Plan (PT)    Therapy Frequency (PT) evaluation only  -       Row Name 01/24/25 1004          Vital Signs    Pre Patient Position Supine  -     Intra Patient Position Standing  -     Post Patient Position Sitting  -       Row Name 01/24/25 1004          Positioning and Restraints    Pre-Treatment Position in bed  -SM     Post Treatment Position chair  -SM     In Chair notified nsg;reclined;call light within reach;encouraged to call for assist;exit alarm on  -               User Key  (r) = Recorded By, (t) = Taken By,  (c) = Cosigned By      Initials Name Provider Type     Serenity Cheema PT Physical Therapist                   Outcome Measures       Row Name 01/24/25 1006          How much help from another person do you currently need...    Turning from your back to your side while in flat bed without using bedrails? 4  -SM     Moving from lying on back to sitting on the side of a flat bed without bedrails? 4  -SM     Moving to and from a bed to a chair (including a wheelchair)? 4  -SM     Standing up from a chair using your arms (e.g., wheelchair, bedside chair)? 4  -SM     Climbing 3-5 steps with a railing? 3  -SM     To walk in hospital room? 4  -SM     AM-PAC 6 Clicks Score (PT) 23  -               User Key  (r) = Recorded By, (t) = Taken By, (c) = Cosigned By      Initials Name Provider Type    Serenity Spears PT Physical Therapist                                 Physical Therapy Education       Title: PT OT SLP Therapies (Done)       Topic: Physical Therapy (Done)       Point: Mobility training (Done)       Learning Progress Summary            Patient Acceptance, E, VU by  at 1/24/2025 1006                      Point: Home exercise program (Done)       Learning Progress Summary            Patient Acceptance, E, VU by  at 1/24/2025 1006                      Point: Body mechanics (Done)       Learning Progress Summary            Patient Acceptance, E, VU by  at 1/24/2025 1006                      Point: Precautions (Done)       Learning Progress Summary            Patient Acceptance, E, VU by  at 1/24/2025 1006                                      User Key       Initials Effective Dates Name Provider Type [facility] 05/02/22 -  Serenity Cheema PT Physical Therapist PT                  PT Recommendation and Plan     Outcome Evaluation: Patient is a 79 y.o male POD1 R TKA revision. Patient AOx4 supine in bed upon arrival. Patient lives with his spouse with 2 ABELARDO. Patient educated in TKA post op  protocol. Patient sat up to EOB and ambulated 75ft in the hallway with rwx and SBA. Gait slow and mildly antalgic but steady with no overt LOB noted. Patient reclined in chair at end of session. Patient planning to d/c home today with assist and HHPT follow up. Acute PT will sign off.     Time Calculation:         PT Charges       Row Name 01/24/25 1007             Time Calculation    Start Time 0809  -SM      Stop Time 0821  -      Time Calculation (min) 12 min  -SM      PT Received On 01/24/25  -         Time Calculation- PT    Total Timed Code Minutes- PT 8 minute(s)  -SM         Timed Charges    07929 - PT Therapeutic Activity Minutes 8  -SM         Total Minutes    Timed Charges Total Minutes 8  -SM       Total Minutes 8  -SM                User Key  (r) = Recorded By, (t) = Taken By, (c) = Cosigned By      Initials Name Provider Type     Serenity Cheema, JOHN Physical Therapist                  Therapy Charges for Today       Code Description Service Date Service Provider Modifiers Qty    15302430831 HC PT THERAPEUTIC ACT EA 15 MIN 1/24/2025 Serenity Cheema, PT GP 1    57794587935 HC PT EVAL LOW COMPLEXITY 3 1/24/2025 Serenity Cheema, PT GP 1            PT G-Codes  AM-PAC 6 Clicks Score (PT): 23  PT Discharge Summary  Anticipated Discharge Disposition (PT): home with assist, home with home health    Serenity Cheema PT  1/24/2025

## 2025-01-24 NOTE — DISCHARGE SUMMARY
Orthopaedic Discharge Summary  Dr. FROST “Drake” [facility]  [phone]    NAME: Franco Hernandez PCP: Tara Martin APRN   :  MRN: [id number]  [id number] LOS:  ADMIT: 1 days  2025   AGE/SEX: 79 y.o. male DC:  today             Admitting Diagnosis: Status post knee replacement [Z96.659]    Surgery Performed: TOTAL KNEE ARTHROPLASTY REVISION WITH CORI ROBOT    Discharge Medications:         Discharge Medications        New Medications        Instructions Start Date   HYDROcodone-acetaminophen  MG per tablet  Commonly known as: Norco   1 tablet, Oral, Every 6 Hours PRN      ondansetron 4 MG tablet  Commonly known as: Zofran   4 mg, Oral, Every 6 Hours PRN             Changes to Medications        Instructions Start Date   aspirin 81 MG EC tablet  What changed:   medication strength  how much to take  when to take this  additional instructions   81 mg, Oral, Every 12 Hours             Continue These Medications        Instructions Start Date   atorvastatin 20 MG tablet  Commonly known as: LIPITOR   1 tablet, Daily      Azelastine HCl 137 MCG/SPRAY solution   1 spray, Every Morning      febuxostat 40 MG tablet  Commonly known as: ULORIC   40 mg, Every Other Day      fluticasone 50 MCG/ACT nasal spray  Commonly known as: FLONASE   2 sprays, Daily      glimepiride 2 MG tablet  Commonly known as: AMARYL   4 mg, Every Morning Before Breakfast      glimepiride 2 MG tablet  Commonly known as: AMARYL   2 mg, Daily With Dinner      levocetirizine 5 MG tablet  Commonly known as: XYZAL   5 mg, Every Evening      lisinopril 10 MG tablet  Commonly known as: PRINIVIL,ZESTRIL   5 mg, Oral, Nightly, HOLD JUST NIGHT BEFORE SURGERY ONLY      metoprolol succinate  MG 24 hr tablet  Commonly known as: TOPROL-XL   0.5 tablets, Daily      pantoprazole 40 MG EC tablet  Commonly known as: PROTONIX   1 tablet, Daily      Semaglutide(0.25 or 0.5MG/DOS) 2 MG/3ML solution pen-injector  Commonly known as: OZEMPIC   0.25 mg,  Subcutaneous, Every 7 Days, TO HOLD 1 WEEK BEFORE SURGERY                Vitals:     Vitals:    01/23/25 2028 01/23/25 2029 01/24/25 0118 01/24/25 0511   BP: 143/69 143/69 149/68 110/60   BP Location: Left arm  Right arm Right arm   Patient Position: Sitting  Lying Lying   Pulse: 68 68 82 73   Resp: 18  16 16   Temp: 97.3 °F (36.3 °C)  97.6 °F (36.4 °C) 98.1 °F (36.7 °C)   TempSrc: Oral  Oral Oral   SpO2: 96%  95% 93%   Weight:       Height:           Labs:      Admission on 01/23/2025   Component Date Value Ref Range Status    Glucose 01/23/2025 169 (H)  70 - 130 mg/dL Final    Glucose 01/23/2025 157 (H)  70 - 130 mg/dL Final    Glucose 01/24/2025 244 (H)  65 - 99 mg/dL Final    BUN 01/24/2025 23  8 - 23 mg/dL Final    Creatinine 01/24/2025 1.33 (H)  0.76 - 1.27 mg/dL Final    Sodium 01/24/2025 133 (L)  136 - 145 mmol/L Final    Potassium 01/24/2025 5.2  3.5 - 5.2 mmol/L Final    Chloride 01/24/2025 100  98 - 107 mmol/L Final    CO2 01/24/2025 20.0 (L)  22.0 - 29.0 mmol/L Final    Calcium 01/24/2025 8.3 (L)  8.6 - 10.5 mg/dL Final    BUN/Creatinine Ratio 01/24/2025 17.3  7.0 - 25.0 Final    Anion Gap 01/24/2025 13.0  5.0 - 15.0 mmol/L Final    eGFR 01/24/2025 54.4 (L)  >60.0 mL/min/1.73 Final    WBC 01/24/2025 12.09 (H)  3.40 - 10.80 10*3/mm3 Final    RBC 01/24/2025 4.12 (L)  4.14 - 5.80 10*6/mm3 Final    Hemoglobin 01/24/2025 11.3 (L)  13.0 - 17.7 g/dL Final    Hematocrit 01/24/2025 34.4 (L)  37.5 - 51.0 % Final    MCV 01/24/2025 83.5  79.0 - 97.0 fL Final    MCH 01/24/2025 27.4  26.6 - 33.0 pg Final    MCHC 01/24/2025 32.8  31.5 - 35.7 g/dL Final    RDW 01/24/2025 12.9  12.3 - 15.4 % Final    RDW-SD 01/24/2025 38.9  37.0 - 54.0 fl Final    MPV 01/24/2025 12.6 (H)  6.0 - 12.0 fL Final    Platelets 01/24/2025 171  140 - 450 10*3/mm3 Final        No results found.    Hospital Course:   79 y.o. male was admitted to [facility] to services of Alfredo Torres II, MD with Status post knee replacement  "[Z96.659] on 1/23/2025 and underwent TOTAL KNEE ARTHROPLASTY REVISION WITH CORI ROBOT. Post-operatively the patient transferred to the floor where the patient underwent mobilization therapy. Opioids were titrated to achieve appropriate pain management to allow for participation in mobilization exercises. Vital signs and laboratory values are now within safe parameters for discharge. The dressings and/or incision is intact without signs or symptoms of active infection. Operative extremity neurovascular status remains intact as compared to the preoperative exam. Appropriate education re: incision care, activity levels, medications, and follow up visits was completed and all questions were answered. The patient is now deemed stable for discharge.    HOME: The patient progressed well with physical therapy. There were cleared for discharge to home. The paramjit was sent home in good condition}.       R \"Drake\" Melissa MUSTAFA MD  Orthopaedic Surgery  [facility]  [phone]                                               "

## 2025-01-25 ENCOUNTER — HOME CARE VISIT (OUTPATIENT)
Dept: HOME HEALTH SERVICES | Facility: HOME HEALTHCARE | Age: 80
End: 2025-01-25
Payer: COMMERCIAL

## 2025-01-25 ENCOUNTER — HOME CARE VISIT (OUTPATIENT)
Dept: HOME HEALTH SERVICES | Facility: HOME HEALTHCARE | Age: 80
End: 2025-01-25
Payer: MEDICARE

## 2025-01-25 VITALS
SYSTOLIC BLOOD PRESSURE: 124 MMHG | TEMPERATURE: 98.2 F | DIASTOLIC BLOOD PRESSURE: 58 MMHG | HEART RATE: 87 BPM | HEIGHT: 73 IN | WEIGHT: 270 LBS | BODY MASS INDEX: 35.78 KG/M2 | RESPIRATION RATE: 18 BRPM | OXYGEN SATURATION: 95 %

## 2025-01-25 PROCEDURE — G0151 HHCP-SERV OF PT,EA 15 MIN: HCPCS

## 2025-01-26 NOTE — HOME HEALTH
"SOC Note:     Home Health ordered for: PT    Reason for Hosp/Primary Dx/Co-morbidities: Patient underwent lt total knee revision due to failed partial knee repalcement done several years ago.    Focus of Care: Aftercare Lt TKA revision    Patient's goal(s): \"To get rid of device\"    Current Functional status/mobility/DME: Patient requires cga/min assist coming to stand and ambulates about 50ft with rolling walker and cga, showing short stance on lt le with discontinous steps and severe limp    HB status/Living Arrangements: Patient lives in one-story home with spouse who is assisting as needed    Skin Integrity/wound status: Lt knee surgical incision is unobservable per gala dressing    Code Status: Full code    Fall Risk/Safety concerns: Fall risk post Lt TKR revision    Educated on Emergency Plan, steps to take prior to going to the ER and when to Call Home Health First: Yes     Medication issues/Concerns: None    Additional Problems/Concerns: N/A    SDOH Barriers (i.e. caregiver concerns, social isolation, transportation, food insecurity, environment, income etc.)/Need for MSW: No    PT will require additional PT 3wk1, 1wk1 for pain control in lt knee, strengthening exercise to lt le, range of motion exercise to lt knee, transfer teaching and gait training"

## 2025-01-27 ENCOUNTER — HOME CARE VISIT (OUTPATIENT)
Dept: HOME HEALTH SERVICES | Facility: HOME HEALTHCARE | Age: 80
End: 2025-01-27
Payer: COMMERCIAL

## 2025-01-27 PROCEDURE — G0151 HHCP-SERV OF PT,EA 15 MIN: HCPCS

## 2025-01-28 VITALS
RESPIRATION RATE: 18 BRPM | DIASTOLIC BLOOD PRESSURE: 60 MMHG | HEART RATE: 80 BPM | OXYGEN SATURATION: 97 % | TEMPERATURE: 98.1 F | SYSTOLIC BLOOD PRESSURE: 120 MMHG

## 2025-01-29 ENCOUNTER — HOME CARE VISIT (OUTPATIENT)
Dept: HOME HEALTH SERVICES | Facility: HOME HEALTHCARE | Age: 80
End: 2025-01-29
Payer: COMMERCIAL

## 2025-01-29 PROCEDURE — G0151 HHCP-SERV OF PT,EA 15 MIN: HCPCS

## 2025-01-30 VITALS
HEART RATE: 79 BPM | SYSTOLIC BLOOD PRESSURE: 120 MMHG | DIASTOLIC BLOOD PRESSURE: 64 MMHG | OXYGEN SATURATION: 95 % | RESPIRATION RATE: 18 BRPM | TEMPERATURE: 97.7 F

## 2025-01-31 ENCOUNTER — HOME CARE VISIT (OUTPATIENT)
Dept: HOME HEALTH SERVICES | Facility: HOME HEALTHCARE | Age: 80
End: 2025-01-31
Payer: MEDICARE

## 2025-01-31 PROCEDURE — G0151 HHCP-SERV OF PT,EA 15 MIN: HCPCS

## 2025-02-02 VITALS
TEMPERATURE: 97.7 F | RESPIRATION RATE: 18 BRPM | OXYGEN SATURATION: 96 % | HEART RATE: 76 BPM | SYSTOLIC BLOOD PRESSURE: 120 MMHG | DIASTOLIC BLOOD PRESSURE: 62 MMHG

## 2025-02-03 ENCOUNTER — HOME CARE VISIT (OUTPATIENT)
Dept: HOME HEALTH SERVICES | Facility: HOME HEALTHCARE | Age: 80
End: 2025-02-03
Payer: MEDICARE

## 2025-02-03 PROCEDURE — G0151 HHCP-SERV OF PT,EA 15 MIN: HCPCS

## 2025-02-04 VITALS
RESPIRATION RATE: 18 BRPM | TEMPERATURE: 97.7 F | OXYGEN SATURATION: 93 % | HEART RATE: 70 BPM | SYSTOLIC BLOOD PRESSURE: 120 MMHG | DIASTOLIC BLOOD PRESSURE: 62 MMHG

## 2025-02-05 ENCOUNTER — TREATMENT (OUTPATIENT)
Dept: PHYSICAL THERAPY | Facility: CLINIC | Age: 80
End: 2025-02-05
Payer: MEDICARE

## 2025-02-05 DIAGNOSIS — R26.2 DIFFICULTY WALKING: ICD-10-CM

## 2025-02-05 DIAGNOSIS — Z96.652 S/P TKR (TOTAL KNEE REPLACEMENT), LEFT: Primary | ICD-10-CM

## 2025-02-05 NOTE — PROGRESS NOTES
Physical Therapy Initial Evaluation and Plan of Care  [address] [place] [address]      Patient: Franco Hernandez   : 1945  Diagnosis/ICD-10 Code:  S/P TKR (total knee replacement), left [Z96.652]  Referring practitioner: Alfredo Torres*  Date of Initial Visit: 2025  Today's Date: 2025  Patient seen for 1 session           Visit Diagnoses:     ICD-10-CM ICD-9-CM   1. S/P TKR (total knee replacement), left  Z96.652 V43.65   2. Difficulty walking  R26.2 719.7       Subjective Questionnaire: LEFS: 15/80 = 18.75% ability/81.25% limited    Subjective Evaluation    History of Present Illness  Mechanism of injury: Pt reports L knee pain which began years ago. Pt had L hemiarthroplasty in . Pt states it wore out and was loose with the lateral side more worn out. Pt states it would feel like it was going to crumble on him. Pt states he almost fell getting dressed before he came in today as he was standing and didn't have any AD. Pt notes he has a walker and a SPC.     Pt had surgery on 25 and was in the hospital over night. Pt had some home PT x5 visits. Pt arrives to iniate OPPT. Pt notes his neck doesn't go back so he can't lay flat and would prefer sitting for PT as able. Pt states he is supposed to get the staples out at 3 weeks post surgery. Pt normally wears compressions stockings and notes he's not sure when he can put them back on on the L LE. Pt is not sure about post surgical instructions in general because he said when he was in the hospital he would put the papers down and they would disappear.     Pt also notes that he went out to eat yesterday and it took a toll on him so he has not been looking forward to coming out to PT today.     Denies hx: pacemaker, CVA, seizures, latex allergies, OP    Pain: 5/10 current, 2/10 at best, 8/10 at worst    Aggravating/functional factors: sitting, standing > walking, bending, twisting, not able to squat,  lifting/carrying due to being off balance (not due to knee pain), curbs/steps/stairs, washing, dressing, grooming, in/out of car due to size of his feet more than the knee, rising, house work, golfing, in/out of tub, and driving    PLOF: no prior issues with the above functional activities    Relieving factors: propping it up    Social Hx: lives with spouse; 2 steps into house and stairs to basement; retired; golfing      Quality of life: good    Pain  Quality: dull ache  Progression: no change (post surgical pain)    Hand dominance: right    Treatments  Previous treatment: physical therapy  Discharged from (in last 30 days): inpatient hospitalization  Patient Goals  Patient goals for therapy: decreased pain, increased strength, independence with ADLs/IADLs, return to sport/leisure activities, increased motion and improved balance  Patient goal: return to normal function         Past Medical History:   Diagnosis Date    Coronary artery disease     Deviated septum     Diabetes mellitus     Dysphagia     GERD (gastroesophageal reflux disease)     Gout     History of MI (myocardial infarction)     Hypertension     Kidney stone     Sleep apnea     CPAP    Stage 3 chronic kidney disease    Allergies per list in EPIC     Past Surgical History:   Procedure Laterality Date    ANGIOPLASTY  2013    APPENDECTOMY      COLONOSCOPY      CYSTOSCOPY INSERTION / REMOVAL STENT / STONE      2009, 2020    ESOPHAGEAL DILATATION      EXTRACORPOREAL SHOCK WAVE LITHOTRIPSY (ESWL)      KIDNEY STONE SURGERY  2015    NASAL POLYP SURGERY  1972    PARTIAL KNEE ARTHROPLASTY Left 2008    TOTAL KNEE ARTHROPLASTY Right 2011    TOTAL KNEE ARTHROPLASTY REVISION Left 1/23/2025    Procedure: TOTAL KNEE ARTHROPLASTY REVISION WITH CORI ROBOT;  Surgeon: Alfredo Torres II, MD;  Location: [facility];  Service: Robotics - Ortho;  Laterality: Left;    VEIN SURGERY Left 2010   Removed skin cancer 4-5 places.   Cardiac stent    Objective           Active Range of Motion   Left Knee   Flexion: 80 degrees   Extensor la degrees     Strength/Myotome Testing     Left Hip   Planes of Motion   Flexion: 2+    Right Hip   Planes of Motion   Flexion: 4-    Left Knee   Flexion: 2-  Extension: 2+    Right Knee   Flexion: 5  Extension: 4+    Left Ankle/Foot   Dorsiflexion: 4+    Right Ankle/Foot   Dorsiflexion: 4+  Inversion: 4+  Eversion: 4+    Additional Strength Details  Seated hip abd/ER erik mod resistance  Seated hip add/IR erik min to min/mod resistance          Observation: staples intact; significant ecchymosis over L distal thigh & ant knee    Palpation: TTP over incision    Sensation: intact to LT at L LE    Posture: head fwd/rounded shoulders    Gait: I with SPC with antalgia with mod reduced gt speed and step length; poor sequencing going down curb to vehicle    Balance: appears fair/good with moving around the clinic; lack of ability to control descent to plinth after moving from chair to plinth    Transfers: I sit to/from stand with UE A and multi attempts rising    Flexibility: tight hams, quads, calves, hip flexors (did not test hip rotators or ITB today)    Special Tests: Angelina's (-)    Pt education: discussed findings, POC, goals, HEP, icing/elevating, ankle pumps for edema control; discussed contacting MD regarding wearing his compression socks; pt declined pictures of HEP as he notes he's done this before; discussed sequencing with gait especially for going up/down curbs or steps; discussed sitting for getting dressed to avoid falling; discussed that pt's cane is too low for him and is not adjustable - pt does not want to get another one for just a week or so and felt if it was much higher it would feel too high for him    Assessment & Plan       Assessment  Impairments: abnormal coordination, abnormal gait, abnormal muscle firing, abnormal muscle tone, abnormal or restricted ROM, activity intolerance, impaired balance, impaired physical  strength, lacks appropriate home exercise program, pain with function, safety issue and weight-bearing intolerance   Assessment details: The patient is a 79 y.o. male who presents to physical therapy today for S/P TKR (total knee replacement), left on 1/23/25. Upon initial evaluation, the patient demonstrates the above & following impairments: pain, reduced posture, decreased ROM/flexibility, strength, gait, balance and function. Due to these impairments, the patient is unable to/limited with: sitting, standing > walking, bending, twisting, not able to squat, lifting/carrying due to being off balance (not due to knee pain), washing, dressing, grooming, in/out of car due to size of his feet more than the knee, rising, house work, golfing, in/out of tub, and driving. The patient would benefit from skilled PT services to address functional limitations and impairments and to improve patient quality of life.      Barriers to therapy: CAD, hx MI, DM, GERD, gout, CKD stage 3, and kidney stone could affect PT Rx/progress/outcomes if exacerbated/unregulated which could affect tolerance to PT/exs; DM could delay healing  Prognosis: good    Goals  Plan Goals: STGs in 4 weeks:  Decrease pain to 5-6/10 on average  Increase L knee AROM by at least 10 degrees where limited as much  Increase L hip flexion to LE strength to 3+/5  Pt will demonstrate improved gait quality and speed with least AD    LTGs by discharge  Increase L hip flex/knee flex/ext ROM to WFL/WNL  Increase L LE strength to 4+/5   Pt will be able to ascend/descend stairs reciprocally with or without use of rail(s) and with minimal difficulty or pain  Pt will be able to sit/drive/ride for 30-60 mins without difficulty or pain  Pt will be able to stand and walk 30-60 mins for basic ADLs, grocery shopping, or house tasks without difficulty, pain or LOB  Pt will be able to wash/dress/groom without difficulty or increased pain  Pt will be able to lift/carry laundry  baskets, pots/pans, garbage or grocery bags without difficulty, LOB, or increased pain    Plan  Therapy options: will be seen for skilled therapy services  Planned modality interventions: cryotherapy, thermotherapy (hydrocollator packs) and electrical stimulation/Russian stimulation  Planned therapy interventions: manual therapy, neuromuscular re-education, postural training, soft tissue mobilization, spinal/joint mobilization, strengthening, stretching, therapeutic activities, transfer training, abdominal trunk stabilization, ADL retraining, body mechanics training, home exercise program, gait training, functional ROM exercises, flexibility, motor coordination training, balance/weight-bearing training and joint mobilization  Frequency: 3x week  Duration in weeks: 13  Treatment plan discussed with: patient        History # of Personal Factors and/or Comorbidities: HIGH (3+)  Examination of Body System(s): # of elements: HIGH (4+)  Clinical Presentation: EVOLVING variable pain, high pain levels, reduced balance, comorbidity  Clinical Decision Making: MODERATE      Timed:         Manual Therapy:         mins  45763;     Therapeutic Exercise:   10      mins  58307;     Neuromuscular Lourdes:        mins  16373;    Therapeutic Activity:          mins  76550;     Gait Training:           mins  20481;     Ultrasound:          mins  81188;    Ionto                                   mins   16840  Self Care                            mins   20382  Canalith Repos         mins 90401      Un-Timed:  Electrical Stimulation:         mins  32324 ( );  Traction          mins 59680  Low Eval          Mins  93612  Mod Eval     31     Mins  42324  High Eval                            Mins  39947  Re-Eval                               mins  62214        Timed Treatment:   10   mins   Total Treatment:     41   mins        PT SIGNATURE: Ely Del Cid, PT   [place] PT Lic# [id number]  DATE TREATMENT INITIATED: 2/5/2025    Medicare Initial  Certification  Certification Period: 2/5/20253578VTDSQQW5/5/2025  I certify that the therapy services are furnished while this patient is under my care.  The services outlined above are required by this patient, and will be reviewed every 90 days.      Physician Signature: _________________________   PHYSICIAN: Alfredo Torres II, MD   NPI: [id number]         DATE: _____________________________________    Please sign and return via fax to [phone]. Thank you, [facility].

## 2025-02-07 ENCOUNTER — TREATMENT (OUTPATIENT)
Dept: PHYSICAL THERAPY | Facility: CLINIC | Age: 80
End: 2025-02-07
Payer: MEDICARE

## 2025-02-07 ENCOUNTER — TELEPHONE (OUTPATIENT)
Dept: PHYSICAL THERAPY | Facility: CLINIC | Age: 80
End: 2025-02-07

## 2025-02-07 DIAGNOSIS — Z96.652 S/P TKR (TOTAL KNEE REPLACEMENT), LEFT: Primary | ICD-10-CM

## 2025-02-07 DIAGNOSIS — R26.2 DIFFICULTY WALKING: ICD-10-CM

## 2025-02-07 NOTE — TELEPHONE ENCOUNTER
Caller: Franco Hernandez    Relationship: Self       What was the call regarding: MADE IT HOME SAFELY

## 2025-02-07 NOTE — PROGRESS NOTES
Physical Therapy Treatment Note  [id number] [place] [address]      Patient: Franco Hernandez   : 1945  Diagnosis/ICD-10 Code:  S/P TKR (total knee replacement), left [Z96.652]  Referring practitioner: Alfredo Torres*  Date of Initial Visit: Type: THERAPY  Noted: 2025  Today's Date: 2025  Patient seen for 2 sessions           Visit Diagnoses:     ICD-10-CM ICD-9-CM   1. S/P TKR (total knee replacement), left  Z96.652 V43.65   2. Difficulty walking  R26.2 719.7       Subjective Franco Hernandez reports he couldn't get the leg to relax last night and it was throbbing all night. Pain is 6/10 currently. Pt states the PT doesn't bother him, but the getting in/out of the car is extremely difficult for him and he has to take his shoe off to get the leg in.     Objective   L knee   Ext lag 27 degrees after leg press into BOSU  Flex 105 degrees seated with heel slides    SpO2 93 after standing at end of session; performed diaphragmatic breathing and returned to 99% with HR 99    BP 76/48, 80/40 x 2, 76/38    See Exercise, Manual, Modality, and/or Vestibular Logs for complete treatment.     Patient Education: cues for therex/theracts/NMR for form, reps, holds, and for avoiding compensation and pain; discussed/encouraged pt to be seen by EMS to determine if they recommend he go to the ED    Assessment/Plan Progressed per flowsheet.  Pt required frequent brief rest breaks between tasks. Pt tolerated most of session well, then felt faint after standing tasks. Pt sat to rest and vitals were checked. Pt noted that his BP tends to range around 100/60, but that it drops at times and sometimes it's elevated. Pt notes that this happens when he bends over to  a golf ball and that it returns to normal and he feels better after resting. However, BP was not recovering even though pt was feeling better. Primary PT and Natalie Amezcua PT (clinic manager) both discussed that we want  pt to be safe and that we are concerned that if something happened his wife might not be able to get him out of the vehicle. We offered to call an ambulance and they refused with PT taking pt out to vehicle in a w/c and assisting into the car with taking off pt's L sneaker. Pt's wife noted that her daughter is a nurse and they would call her to see if she thinks they should go to ED. Pt is to [facility] on 2/13/25.       Progress per Plan of Care and Progress strengthening /stabilization /functional activity            Timed:         Manual Therapy:         mins  86469;     Therapeutic Exercise:   8      mins  64482;     Neuromuscular Lourdes:  13     mins  29955;    Therapeutic Activity:    22    mins  30081;     Gait Training:           mins  26940;     Ultrasound:          mins  61845;    Ionto                                   mins   57820    Self Care                            mins   66205    Un-Timed:  Electrical Stimulation:         mins  18714 ( );  Traction          mins 74203  Canalith Repos                   mins  96060  Re-Eval                               mins  16827    Timed Treatment:  43    mins   Total Treatment:    43    mins          Ely Del Cid, PT    Physical Therapist

## 2025-02-10 ENCOUNTER — TREATMENT (OUTPATIENT)
Dept: PHYSICAL THERAPY | Facility: CLINIC | Age: 80
End: 2025-02-10
Payer: MEDICARE

## 2025-02-10 DIAGNOSIS — Z96.652 S/P TKR (TOTAL KNEE REPLACEMENT), LEFT: Primary | ICD-10-CM

## 2025-02-10 DIAGNOSIS — R26.2 DIFFICULTY WALKING: ICD-10-CM

## 2025-02-10 PROCEDURE — 97110 THERAPEUTIC EXERCISES: CPT | Performed by: PHYSICAL THERAPIST

## 2025-02-10 PROCEDURE — 97530 THERAPEUTIC ACTIVITIES: CPT | Performed by: PHYSICAL THERAPIST

## 2025-02-10 PROCEDURE — 97112 NEUROMUSCULAR REEDUCATION: CPT | Performed by: PHYSICAL THERAPIST

## 2025-02-10 NOTE — PROGRESS NOTES
Physical Therapy Treatment Note  [place]    [address] [place] [address]      Patient: Fracno Hernandez   : 1945  Diagnosis/ICD-10 Code:  S/P TKR (total knee replacement), left [Z96.652]  Referring practitioner: Alfredo Torres*  Date of Initial Visit: Type: THERAPY  Noted: 2025  Today's Date: 2/10/2025  Patient seen for 3 sessions           Visit Diagnoses:     ICD-10-CM ICD-9-CM   1. S/P TKR (total knee replacement), left  Z96.652 V43.65   2. Difficulty walking  R26.2 719.7       Subjective Franco Hernandez reports his wife's daughter told them that it's not uncommon or the BP to drop like that. Pt notes they've already adjusted his meds by 1/2 in the past. Pt has been checking with his machine (wrist) and it hasn't been as low as it was on Friday. Pt states he did double up on his pain meds which he hasn't done since as he wasn't sure if that did it. Pt did walk a few times at home with and without the cane and noted that it wasn't pretty without the cane. Pt notes that the BP remained stable with what he did at home and his own monitoring.     Pain: 4    Objective /58, SpO2 99%, HR 89; mid session /50; end of session 90/50  AAROM L knee flex 108 degrees with heel slides seated    See Exercise, Manual, Modality, and/or Vestibular Logs for complete treatment.     Patient Education: cues for therex/theracts/NMR for form, reps, holds, and for avoiding compensation and pain; Instr in using car & cane in L hand with step down with the L and instr pt to lift leg into vehicle before sitting down     Assessment/Plan Deferred standing tasks today due to some reduced BP and walked pt out to vehicle. Despite ed on sequencing, pt still stepped down with R LE off the curb. Pt was able to lift the L LE into the car, but pt continued to step down with the R. Pt noted he just has to be more careful not to hit his head if he gets in the car like that. Pt will ice at home.      Progress per Plan of Care and Progress strengthening /stabilization /functional activity            Timed:         Manual Therapy:         mins  02081;     Therapeutic Exercise:   20      mins  78319;     Neuromuscular Lourdes:  10      mins  97329;    Therapeutic Activity:    10      mins  03748;     Gait Training:           mins  02475;     Ultrasound:          mins  26448;    Ionto                                   mins   07765  Self Care                            mins   10667    Un-Timed:  Electrical Stimulation:         mins  69544 ( );  Traction          mins 45661  Canalith Repos                   mins  14844  Re-Eval                               mins  21703    Timed Treatment:  40    mins   Total Treatment:     40   mins          Ely Del Cid, PT    Physical Therapist

## 2025-02-12 ENCOUNTER — TREATMENT (OUTPATIENT)
Dept: PHYSICAL THERAPY | Facility: CLINIC | Age: 80
End: 2025-02-12
Payer: MEDICARE

## 2025-02-12 DIAGNOSIS — R26.2 DIFFICULTY WALKING: ICD-10-CM

## 2025-02-12 DIAGNOSIS — Z96.652 S/P TKR (TOTAL KNEE REPLACEMENT), LEFT: Primary | ICD-10-CM

## 2025-02-12 NOTE — PROGRESS NOTES
Physical Therapy Treatment Note  [place]    [address] [facility], [place] [address]      Patient: Franco Hernandez   : 1945  Diagnosis/ICD-10 Code:  S/P TKR (total knee replacement), left [Z96.652]  Referring practitioner: Alfredo Torres*  Date of Initial Visit: Type: THERAPY  Noted: 2025  Today's Date: 2025  Patient seen for 4 sessions           Visit Diagnoses:     ICD-10-CM ICD-9-CM   1. S/P TKR (total knee replacement), left  Z96.652 V43.65   2. Difficulty walking  R26.2 719.7       Subjective Franco Hernandez reports he felt a little whoozy coming in from the waiting room to the table. Pt states his BP was 140 systolic. Pt states his knee has been pretty sore last night to today and not sure why.     Objective   1 beat at 90 then 70/56  1 beat at 100 then 66/46    See Exercise, Manual, Modality, and/or Vestibular Logs for complete treatment.     Patient Education: cues for therex/theracts/NMR for form, reps, holds, and for avoiding compensation and pain; discussed contacting PCP regarding BP fluctuations; discussed that pt might be more achy from the inclement weather and the staples; reiterated sequencing for down curb    Assessment/Plan  Removed staples with ed on wound care followed by tasks per flow sheet. Pt did get dizzy with getting up again at end of session, but was able to walk to vehicle with PT escort & gt belt. Pt's wife feels the BP cuff must be wrong because his BP is not usually that low.     Progress per Plan of Care and Progress strengthening /stabilization /functional activity            Timed:         Manual Therapy:         mins  89423;     Therapeutic Exercise:   22      mins  48222;     Neuromuscular Lourdes:   8     mins  12149;    Therapeutic Activity:     8     mins  24210;     Gait Training:           mins  82831;     Ultrasound:          mins  47608;    Ionto                                   mins   14010  Self Care                            mins    11434    Un-Timed:  Electrical Stimulation:         mins  33976 ( );  Traction          mins 22566  Canalith Repos                   mins  19567  Re-Eval                               mins  31993    Timed Treatment:   38   mins   Total Treatment:     38   mins          Ely Del Cid PT    Physical Therapist

## 2025-02-14 ENCOUNTER — TREATMENT (OUTPATIENT)
Dept: PHYSICAL THERAPY | Facility: CLINIC | Age: 80
End: 2025-02-14
Payer: MEDICARE

## 2025-02-14 DIAGNOSIS — R26.2 DIFFICULTY WALKING: ICD-10-CM

## 2025-02-14 DIAGNOSIS — Z96.652 S/P TKR (TOTAL KNEE REPLACEMENT), LEFT: Primary | ICD-10-CM

## 2025-02-14 PROCEDURE — 97530 THERAPEUTIC ACTIVITIES: CPT | Performed by: PHYSICAL THERAPIST

## 2025-02-14 PROCEDURE — 97112 NEUROMUSCULAR REEDUCATION: CPT | Performed by: PHYSICAL THERAPIST

## 2025-02-14 PROCEDURE — 97110 THERAPEUTIC EXERCISES: CPT | Performed by: PHYSICAL THERAPIST

## 2025-02-14 NOTE — PROGRESS NOTES
Physical Therapy Treatment Note/MD note  [place]    [address] [place] [address]      Patient: Franco Hernandez   : 1945  Diagnosis/ICD-10 Code:  S/P TKR (total knee replacement), left [Z96.652]  Referring practitioner: Alfredo Torres*  Date of Initial Visit: Type: THERAPY  Noted: 2025  Today's Date: 2025  Patient seen for 5 sessions           Visit Diagnoses:     ICD-10-CM ICD-9-CM   1. S/P TKR (total knee replacement), left  Z96.652 V43.65   2. Difficulty walking  R26.2 719.7       Subjective Franco Hernandez reports a steady ache at the L knee. Pt states it keeps him awake at night sometimes. Yesterday, pt got up and walked to the kitchen 1x every hour he was awake and mostly without the cane. Pt is now rolling over in bed easily. Pt states when he woke up this morning he slept so hard that when he woke up (on L side), both of his legs were numb. Pt notes BP was 143/84 before he left this morning with his wrist cuff. Pt notes he didn't take his BP meds last night. Pt has him compression stockings on today, but didn't pull them all the way up due to the incision and     Pain: 4-5    Objective   BP in mm Hg (taken at R UE):   At beginning of session after walking into exam room:  BP with wrist cuff machine 126/79; HR 97  BP with arm cuff machine 117/74; HR 97     After seated exs before standing:  BP with arm cuff manual: 104/72,     After standing up quickly:  BP with arm cuff manual: 104/60  BP with wrist cuff machine: 106/67    After standing tasks ~5 mins:  BP with wrist cuff machine: 95/56,     ROM (degrees):  AAROM L knee (seated heel slides): 0-; PROM L knee ext lag 20 w/heel prop    Gait: antalgic, reduced gt speed, lacks TKE, HS/TO     See Exercise, Manual, Modality, and/or Vestibular Logs for complete treatment.     Patient Education: cues for therex/theracts/NMR for form, reps, holds, and for avoiding compensation and pain; discussed with pt  that if he didn't take his BP meds last night, his BP would likely be a little higher today than when he did take his meds regularly; discussed using the cane until his gait is more normalized and he is no longer feeling faint with standing and walking; discussed a walker would help him feel steadier; strongly encouraged pt to contact PCP regarding fluctuations in BP with PT and to avoid self-adjusting meds unless PCP says it's ok    Assessment/Plan  Pt is making good progress with knee flex, but is still quite limited with knee ext (was AROM lag of 40 degrees at initial eval and today AAROM lag of 20 degrees toward end of session. Pt's incision appears to be healing and ecchymosis is gradually improving.     PT took BP with wrist/arm cuff machines and manually as pt & pt's spouse were concerned that the BP readings done manually in prior visits were inaccurate. Pt's wrist cuff appears about 10 mm Hg higher systolic and 5 mm Hg higher diastolic than arm cuff machine, but was closer to manual reading taken in sitting. Pt did not feel faint or dizzy with coming in this morning, but did toward end of session after standing tasks. Pt rested for a few minutes before PT assisted him out to the vehicle. Pt was able to demonstrate correct/safe step down off of the curb using vehicle and cane.     Pt's cane is too short for him, but he felt a higher one would not be comfortable. Pt has been trying to walk short distances at home without the cane. A section of the steri-strips came off, so recovered those areas.    Progress per Plan of Care and Progress strengthening /stabilization /functional activity            Timed:         Manual Therapy:         mins  68275;     Therapeutic Exercise:   12      mins  03379;     Neuromuscular Lourdes:  18     mins  02322;    Therapeutic Activity:    25     mins  32467;     Gait Training:           mins  45552;     Ultrasound:          mins  34429;    Ionto                                    mins   36686  Self Care                            mins   90039    Un-Timed:  Electrical Stimulation:         mins  46675 ( );  Traction          mins 22590  Canalith Repos                   mins  34429  Re-Eval                               mins  41923    Timed Treatment:   55   mins   Total Treatment:     55   mins          Ely Del Cid, PT    Physical Therapist

## 2025-02-14 NOTE — LETTER
Physical Therapy Treatment Note/MD note  [place]    [address] [place] [address]        Patient: Franco Hernandez   : 1945  Diagnosis/ICD-10 Code:  S/P TKR (total knee replacement), left [Z96.652]  Referring practitioner: Alfredo Torres*  Date of Initial Visit: Type: THERAPY  Noted: 2025  Today's Date: 2025  Patient seen for 5 sessions               Visit Diagnoses:   Visit Diagnosis       ICD-10-CM ICD-9-CM   1. S/P TKR (total knee replacement), left  Z96.652 V43.65   2. Difficulty walking  R26.2 719.7            Subjective Franco Hernandez reports a steady ache at the L knee. Pt states it keeps him awake at night sometimes. Yesterday, pt got up and walked to the kitchen 1x every hour he was awake and mostly without the cane. Pt is now rolling over in bed easily. Pt states when he woke up this morning he slept so hard that when he woke up (on L side), both of his legs were numb. Pt notes BP was 143/84 before he left this morning with his wrist cuff. Pt notes he didn't take his BP meds last night. Pt has him compression stockings on today, but didn't pull them all the way up due to the incision and      Pain: 4-5     Objective   BP in mm Hg (taken at R UE):   At beginning of session after walking into exam room:  BP with wrist cuff machine 126/79; HR 97  BP with arm cuff machine 117/74; HR 97      After seated exs before standing:  BP with arm cuff manual: 104/72,      After standing up quickly:  BP with arm cuff manual: 104/60  BP with wrist cuff machine: 106/67     After standing tasks ~5 mins:  BP with wrist cuff machine: 95/56,      ROM (degrees):  AAROM L knee (seated heel slides): 0-; PROM L knee ext lag 20 w/heel prop     Gait: antalgic, reduced gt speed, lacks TKE, HS/TO      See Exercise, Manual, Modality, and/or Vestibular Logs for complete treatment.      Patient Education: cues for therex/theracts/NMR for form, reps, holds, and for avoiding  compensation and pain; discussed with pt that if he didn't take his BP meds last night, his BP would likely be a little higher today than when he did take his meds regularly; discussed using the cane until his gait is more normalized and he is no longer feeling faint with standing and walking; discussed a walker would help him feel steadier; strongly encouraged pt to contact PCP regarding fluctuations in BP with PT and to avoid self-adjusting meds unless PCP says it's ok     Assessment/Plan  Pt is making good progress with knee flex, but is still quite limited with knee ext (was AROM lag of 40 degrees at initial eval and today AAROM lag of 20 degrees toward end of session. Pt's incision appears to be healing and ecchymosis is gradually improving.      PT took BP with wrist/arm cuff machines and manually as pt & pt's spouse were concerned that the BP readings done manually in prior visits were inaccurate. Pt's wrist cuff appears about 10 mm Hg higher systolic and 5 mm Hg higher diastolic than arm cuff machine, but was closer to manual reading taken in sitting. Pt did not feel faint or dizzy with coming in this morning, but did toward end of session after standing tasks. Pt rested for a few minutes before PT assisted him out to the vehicle. Pt was able to demonstrate correct/safe step down off of the curb using vehicle and cane.      Pt's cane is too short for him, but he felt a higher one would not be comfortable. Pt has been trying to walk short distances at home without the cane. A section of the steri-strips came off, so recovered those areas.     Progress per Plan of Care and Progress strengthening /stabilization /functional activity            Timed:                                                    Manual Therapy:                 mins  52814;                      Therapeutic Exercise:   12      mins  98284;     Neuromuscular Lourdes:  18     mins  74811;    Therapeutic Activity:     25     mins  09688;     Gait  Training:                      mins  66892;     Ultrasound:                          mins  61790;    Ionto                                   mins   91143  Self Care                            mins   24382     Un-Timed:  Electrical Stimulation:         mins  87265 ( );  Traction                               mins 69100  Canalith Repos                   mins  53379  Re-Eval                               mins  80916     Timed Treatment:   55   mins   Total Treatment:     55   mins             Ely Del Cid, PT     Physical Therapist

## 2025-02-18 ENCOUNTER — TREATMENT (OUTPATIENT)
Dept: PHYSICAL THERAPY | Facility: CLINIC | Age: 80
End: 2025-02-18
Payer: MEDICARE

## 2025-02-18 DIAGNOSIS — R26.2 DIFFICULTY WALKING: ICD-10-CM

## 2025-02-18 DIAGNOSIS — Z96.652 S/P TKR (TOTAL KNEE REPLACEMENT), LEFT: Primary | ICD-10-CM

## 2025-02-18 NOTE — PROGRESS NOTES
Physical Therapy Treatment Note  [place] [address] [place] [address]      Patient: Franco Hernandez   : 1945  Diagnosis/ICD-10 Code:  S/P TKR (total knee replacement), left [Z96.652]  Referring practitioner: Alfredo Torres*  Date of Initial Visit: Type: THERAPY  Noted: 2025  Today's Date: 2025  Patient seen for 6 sessions           Visit Diagnoses:     ICD-10-CM ICD-9-CM   1. S/P TKR (total knee replacement), left  Z96.652 V43.65   2. Difficulty walking  R26.2 719.7       Subjective Franco Hernandez reports he saw the doctor and was given pain meds and sleep meds. Pt states he sleeps in the chair when he takes the pills, but then he doesn't get to use his CPAP machine. Pt states the doctor was concerned with the lack of knee ext and wants pt to return in 3 weeks to see how that is doing. Pt notes he will be getting a dynasplint. Pt also notes his knee ext has been lacking for a long time prior to surg.     Pt continues to report that he does fine at home and his BP does not drop. Pt states it doesn't bother him to walk, but does if he stands. Pt states he gets a dizzy feeling if he stands too long and has to lean on something or sit.     Pt reports his BP was 143/74 at home earlier.     Objective     BP 78/50 mm Hg pre 70/50 mm Hg post  SpO2 98% pre 95% post, HR 94 pre 107 post  Knee ext lag 34 seated AROM; ext lag 16 degrees w/heel prop in sitting  Seated knee flex 112 degrees    See Exercise, Manual, Modality, and/or Vestibular Logs for complete treatment.     Patient Education: cues for therex/theracts/NMR for form, reps, holds, and for avoiding compensation and pain; ed on tasks to increase knee ext and ed on Dynasplint in terms of what the purpose is and that pt will have to wear it for low load, long duration stretch to improve mobility; ed on pillow prop under lower leg (not under knee) to increase knee ext    Assessment/Plan Pt began to feel light headed toward  end of session and had to sit in the chair to stabilize himself. Pt's BP continues to be low during sessions of PT. Pt wanted to come in to work on the knee ext and will be getting a Dynasplint for home. Pt is scheduled for one more appt on Thursday currently. He has canceled tomorrow and Friday's appointments. Pt declined modalities at end of session.        Progress per Plan of Care and Progress strengthening /stabilization /functional activity            Timed:         Manual Therapy:   10      mins  02198;     Therapeutic Exercise:    28     mins  56055;     Neuromuscular Lourdes:        mins  97282;    Therapeutic Activity:          mins  84605;     Gait Training:           mins  58899;     Ultrasound:          mins  85074;    Ionto                                   mins   32433  Self Care                            mins   34605    Un-Timed:  Electrical Stimulation:         mins  32447 ( );  Traction          mins 50370  Canalith Repos                   mins  98067  Re-Eval                               mins  49725    Timed Treatment:   38   mins   Total Treatment:      38  mins          Ely Del Cid, PT    Physical Therapist

## 2025-02-20 ENCOUNTER — TREATMENT (OUTPATIENT)
Dept: PHYSICAL THERAPY | Facility: CLINIC | Age: 80
End: 2025-02-20
Payer: MEDICARE

## 2025-02-20 DIAGNOSIS — R26.2 DIFFICULTY WALKING: ICD-10-CM

## 2025-02-20 DIAGNOSIS — Z96.652 S/P TKR (TOTAL KNEE REPLACEMENT), LEFT: Primary | ICD-10-CM

## 2025-02-20 NOTE — PROGRESS NOTES
Physical Therapy Treatment Note  [place]    [address] [place] [address]      Patient: Franco Hernandez   : 1945  Diagnosis/ICD-10 Code:  S/P TKR (total knee replacement), left [Z96.652]  Referring practitioner: Alfredo Torres*  Date of Initial Visit: Type: THERAPY  Noted: 2025  Today's Date: 2025  Patient seen for 7 sessions           Visit Diagnoses:     ICD-10-CM ICD-9-CM   1. S/P TKR (total knee replacement), left  Z96.652 V43.65   2. Difficulty walking  R26.2 719.7       Subjective Franco Hernandez reports he slept without his CPAP again because he was stuffed up. He made it to about 5 am sleeping and got uncomfortable so moved to the recliner.     Objective   Pre Rx /63, HR 96; Post Rx 121/67 HR 97  Knee ext lag 15 degrees    See Exercise, Manual, Modality, and/or Vestibular Logs for complete treatment.     Patient Education: cues for therex/theracts/NMR for form, reps, holds, and for avoiding compensation and pain    Assessment/Plan Performed most of session sitting in chair with back support which seemed to help pt avoid feeling faint. BP was more regulated today and taken with pt's wrist monitor as readings were close to the manual ones taken by PT with BP cuff. Pt with some improved mobility noted pre to post session as well.       Progress per Plan of Care and Progress strengthening /stabilization /functional activity            Timed:         Manual Therapy:   10      mins  44020;     Therapeutic Exercise:   13     mins  01647;     Neuromuscular Lourdes:  12     mins  17922;    Therapeutic Activity:          mins  27114;     Gait Training:           mins  37487;     Ultrasound:          mins  94676;    Ionto                                   mins   21233  Self Care                            mins   98395    Un-Timed:  Electrical Stimulation:         mins  93480 ( );  Traction          mins 94521  Canalith Repos                   mins  54637  Re-Eval                                mins  32991    Timed Treatment:   35   mins   Total Treatment:     35   mins          Ely Del Cid, PT    Physical Therapist

## 2025-02-24 ENCOUNTER — TREATMENT (OUTPATIENT)
Dept: PHYSICAL THERAPY | Facility: CLINIC | Age: 80
End: 2025-02-24
Payer: MEDICARE

## 2025-02-24 DIAGNOSIS — R26.2 DIFFICULTY WALKING: ICD-10-CM

## 2025-02-24 DIAGNOSIS — Z96.652 S/P TKR (TOTAL KNEE REPLACEMENT), LEFT: Primary | ICD-10-CM

## 2025-02-24 NOTE — PROGRESS NOTES
"Physical Therapy Treatment Note  [place] [address] [place] [address]      Patient: Franco Hernandez   : 1945  Diagnosis/ICD-10 Code:  S/P TKR (total knee replacement), left [Z96.652]  Referring practitioner: Alfredo Torres*  Date of Initial Visit: Type: THERAPY  Noted: 2025  Today's Date: 2025  Patient seen for 8 sessions           Visit Diagnoses:     ICD-10-CM ICD-9-CM   1. S/P TKR (total knee replacement), left  Z96.652 V43.65   2. Difficulty walking  R26.2 719.7       Subjective Franco Hernandez reports he went shopping at [employer] walking [facility] the back [facility] and back to the car, but when he was standing in line to pay, he had to ask them to get him a chair. Pt sat for about 5 mins and was able to go out. Pt states he was able to drive today to session as his wife is getting \"warn out\". Pt is sleeping a little longer until about 6 pm, then gets in the chair until 8 am.     Objective   /67 mm Hg, HR 98 pre  /55 mm Hg, HR 94 mid  /63 mm Hg, HR 93 post    See Exercise, Manual, Modality, and/or Vestibular Logs for complete treatment.     Patient Education: cues for therex/theracts/NMR for form, reps, holds, and for avoiding compensation and pain    Assessment/Plan  Pt tolerated session fairly well without any c/o dizziness or light headed ness. BP continues to be monitored with pt's home wrist machine and did not drop significantly at any point today. Most of the session was performed in supported sitting which seems to help as well.       Progress per Plan of Care and Progress strengthening /stabilization /functional activity            Timed:         Manual Therapy:         mins  93325;     Therapeutic Exercise:   23      mins  64241;     Neuromuscular Lourdes:        mins  23078;    Therapeutic Activity:     1     mins  96552;     Gait Training:           mins  84357;     Ultrasound:          mins  56626;    Ionto                                   mins "   17553  Self Care                            mins   74444    Un-Timed:  Electrical Stimulation:         mins  91462 ( );  Traction          mins 15399  Canalith Repos                   mins  43279  Re-Eval                               mins  39687    Timed Treatment:   24   mins   Total Treatment:     24   mins          Ely Del Cid, PT    Physical Therapist

## 2025-02-27 ENCOUNTER — TREATMENT (OUTPATIENT)
Dept: PHYSICAL THERAPY | Facility: CLINIC | Age: 80
End: 2025-02-27
Payer: MEDICARE

## 2025-02-27 DIAGNOSIS — Z96.652 S/P TKR (TOTAL KNEE REPLACEMENT), LEFT: Primary | ICD-10-CM

## 2025-02-27 DIAGNOSIS — R26.2 DIFFICULTY WALKING: ICD-10-CM

## 2025-02-27 NOTE — PROGRESS NOTES
Physical Therapy Treatment Note  [place]    [address] [facility] [place] [address]      Patient: Franco Hernandez   : 1945  Diagnosis/ICD-10 Code:  S/P TKR (total knee replacement), left [Z96.652]  Referring practitioner: Alfredo Torres*  Date of Initial Visit: Type: THERAPY  Noted: 2025  Today's Date: 2025  Patient seen for 9 sessions           Visit Diagnoses:     ICD-10-CM ICD-9-CM   1. S/P TKR (total knee replacement), left  Z96.652 V43.65   2. Difficulty walking  R26.2 719.7       Subjective Franco Hernandez reports no cane getting medicine yesterday with cart. Pt took a bath today and got a little light headed getting out. He did not need help from his wife at all today. Pt hasn't taken BP meds in almost a week. Pt sees his PCP tomorrow to discuss all of this.     Objective   Pre Rx: /64;  HR 90  Mid Rx: /56, HR 98   End Rx: /60, HR 94     L knee ROM 0- degrees    See Exercise, Manual, Modality, and/or Vestibular Logs for complete treatment.     Patient Education: cues for therex/theracts/NMR for form, reps, holds, and for avoiding compensation and pain    Assessment/Plan BP has been more regulated, but pt has not taken his BP meds for almost a week. Pt will follow up with PCP tomorrow to discuss this. Pt has lost weight which may be contributing to the BP dropping, but this happens mostly with standing in one place or today with getting out of the bath. Added standing TKE into resistance band, but pt needed to sit afterwards to avoid feeling faint.       Progress per Plan of Care and Progress strengthening /stabilization /functional activity            Timed:        Manual Therapy:         mins  59138;     Therapeutic Exercise:   20      mins  07409;     Neuromuscular Lourdes:        mins  71898;    Therapeutic Activity:    10     mins  50186;     Gait Training:           mins  85108;     Ultrasound:          mins  86426;    Ionto                                    mins   62580  Self Care                            mins   74756    Un-Timed:  Electrical Stimulation:         mins  05169 ( );  Traction          mins 07322  Canalith Repos                   mins  34360  Re-Eval                               mins  46294    Timed Treatment:   30   mins   Total Treatment:      30  mins          Eyl Del Cid, PT    Physical Therapist

## 2025-02-27 NOTE — LETTER
Re: BP readings in PT      Physical Therapy Treatment Note  [place]    [address] [facility] [place] [place] [address]        Patient: Franco Hernandez   : 1945  Diagnosis/ICD-10 Code:  S/P TKR (total knee replacement), left [Z96.652]  Referring practitioner: Alfredo Torres*  Date of Initial Visit: Type: THERAPY  Noted: 2025  Today's Date: 2025  Patient seen for 9 sessions               Visit Diagnoses:   Visit Diagnosis       ICD-10-CM ICD-9-CM   1. S/P TKR (total knee replacement), left  Z96.652 V43.65   2. Difficulty walking  R26.2 719.7            Subjective Franco Hernandez reports no cane getting medicine yesterday with cart. Pt took a bath today and got a little light headed getting out. He did not need help from his wife at all today. Pt hasn't taken BP meds in almost a week. Pt sees his PCP tomorrow to discuss all of this.      Objective   Pre Rx: /64;  HR 90  Mid Rx: /56, HR 98   End Rx: /60, HR 94     L knee ROM 0- degrees     See Exercise, Manual, Modality, and/or Vestibular Logs for complete treatment.      Patient Education: cues for therex/theracts/NMR for form, reps, holds, and for avoiding compensation and pain     Assessment/Plan BP has been more regulated, but pt has not taken his BP meds for almost a week. Pt will follow up with PCP tomorrow to discuss this. Pt has lost weight which may be contributing to the BP dropping, but this happens mostly with standing in one place or today with getting out of the bath. Added standing TKE into resistance band, but pt needed to sit afterwards to avoid feeling faint.         Progress per Plan of Care and Progress strengthening /stabilization /functional activity            Timed:                                                    Manual Therapy:                 mins  77946;                      Therapeutic Exercise:   20      mins  90512;     Neuromuscular Lourdes:        mins  56438;    Therapeutic Activity:      10     mins  51269;     Gait Training:                      mins  96563;     Ultrasound:                          mins  77319;    Ionto                                   mins   66208  Self Care                            mins   89707     Un-Timed:  Electrical Stimulation:         mins  63803 ( );  Traction                               mins 99256  Canalith Repos                   mins  40003  Re-Eval                               mins  64656     Timed Treatment:   30   mins   Total Treatment:      30  mins             Ely Del Cid, PT     Physical Therapist

## 2025-03-03 ENCOUNTER — TREATMENT (OUTPATIENT)
Dept: PHYSICAL THERAPY | Facility: CLINIC | Age: 80
End: 2025-03-03
Payer: MEDICARE

## 2025-03-03 DIAGNOSIS — R26.2 DIFFICULTY WALKING: ICD-10-CM

## 2025-03-03 DIAGNOSIS — Z96.652 S/P TKR (TOTAL KNEE REPLACEMENT), LEFT: Primary | ICD-10-CM

## 2025-03-03 NOTE — LETTER
Physical Therapy Treatment Note/Progress Note  [address] [place] [address]        Patient: Franco Hernandez   : 1945  Diagnosis/ICD-10 Code:  S/P TKR (total knee replacement), left [Z96.652]  Referring practitioner: Alfredo Torres*  Date of Initial Visit: Type: THERAPY  Noted: 2025  Today's Date: 3/3/2025  Patient seen for 10 sessions               Visit Diagnoses:   Visit Diagnosis       ICD-10-CM ICD-9-CM   1. S/P TKR (total knee replacement), left  Z96.652 V43.65   2. Difficulty walking  R26.2 719.7         Subjective Questionnaire: LEFS 33/80 = 41.25% ability/58.75% limited     Subjective Franco Hernandez reports they put him on the lowest dose of Lisinopril and lowered the dose on his other BP med to 1/2 pill. Pt states he decided to only take 1/4 of a pill of the other med. Pt will follow up in another 6 weeks.      Pt feels his stamina is getting better as he was able to shop and check out without feeling dizzy. Pt states he was able to shave after getting out of the bath and feeling good. Pt is dressing ok and can get in/out of the car without much issue as long as there's not a car too close to the 's door, otherwise he won't be able to bend enough to get the leg/foot in.      Pt states his leg swelled up more yesterday and it's been more sore from that.      Pt with continued difficulty with: getting things off the floor, squatting, walking, standing, rolling in bed, stairs especially reduced depth steps into basement due to length of feet, or getting down on the floor is still limited.     Pain: avg 4     Objective      /75 mm/Hg,    /71 mm/Hg,   BP 93/47 mm/Hg,   after standing tasks; rested for several minutes to measure again before leaving  /58 mm/Hg, HR 89  (Target  is 85%, 126 is 90% of max)      Active Range of Motion   Left Knee   Flexion: 110 degrees   Extensor la degrees; AAROM 15 degrees  with pulling     L hip flex 70 L/ R 95 (but does have to lean back on hands to elevate this high)      Strength/Myotome Testing (in sitting, in available range)     Left Hip   Planes of Motion   Flexion: 2+     Right Hip   Planes of Motion   Flexion: 4- to 4     Left Knee   Flexion: 4-4+  Extension: 4- to 4     Gait I without AD today; antalgic; reduced TKE, hip flex/ext, HS/TO; reduced gt speed and step length     See Exercise, Manual, Modality, and/or Vestibular Logs for complete treatment.      Patient Education: cues for therex/theracts/NMR for form, reps, holds, and for avoiding compensation and pain; pt discussed contacting his heart doctor after the BP dropped again with standing tasks - suggested contacting either PCP and/or cardiologist regarding feeling of dizziness; reviewed exs as performed     Assessment/Plan Pt is reporting reduced pain and swelling overall, but some increased swelling yesterday possibly due to increased activity levels. Pt tolerated session fairly well with improved vitals, so progressed standing tasks as tolerated, but with resultant drop in BP again with pt having to sit. Pt rested until BP started to rise again.      Pt with improved mobility, strength and function since eval. Pt with continued difficulty with: getting things off the floor, squatting, walking, standing, rolling in bed, stairs especially reduced depth steps into basement due to length of feet, or getting down on the floor is still limited.     Pt has met 2 goals, partially met 1 goal, is progressing with 6 goals and has not met 1 goal. Current Goals/POC continue to be appropriate for this pt. Recommend continued skilled PT with current POC/interventions until goal attainment, I HEP and return to PLOF. Current POC expires 5/5/25.      Goals  Plan Goals: STGs in 4 weeks:  Decrease pain to 5-6/10 on average Met  Increase L knee AROM by at least 10 degrees where limited as much Met  Increase L hip flexion to LE strength  to 3+/5 Not Met  Pt will demonstrate improved gait quality and speed with least AD Progressing     LTGs by discharge  Increase L hip flex/knee flex/ext ROM to WFL/WNL Progressing  Increase L LE strength to 4+/5 Progressing  Pt will be able to ascend/descend stairs reciprocally with or without use of rail(s) and with minimal difficulty or pain Progressing - can go down curb I today  Pt will be able to sit/drive/ride for 30-60 mins without difficulty or pain Progressing  Pt will be able to stand and walk 30-60 mins for basic ADLs, grocery shopping, or house tasks without difficulty, pain or LOB Progressing  Pt will be able to wash/dress/groom without difficulty or increased pain Partially Met  Pt will be able to lift/carry laundry baskets, pots/pans, garbage or grocery bags without difficulty, LOB, or increased pain        Progress per Plan of Care and Progress strengthening /stabilization /functional activity            Timed:                                                    Manual Therapy:                 mins  04336;                      Therapeutic Exercise:   14     mins  00096;     Neuromuscular Lourdes:   8      mins  29137;    Therapeutic Activity:     10     mins  23966;     Gait Training:                      mins  74922;     Ultrasound:                          mins  93410;    Ionto                                   mins   30973  Self Care                            mins   90951     Un-Timed:  Electrical Stimulation:         mins  73543 ( );  Traction                               mins 12757  Canalith Repos                   mins  12319  Re-Eval                               mins  15641     Timed Treatment:   32   mins   Total Treatment:      32    mins             Ely Del Cid, PT     Physical Therapist

## 2025-03-03 NOTE — PROGRESS NOTES
Physical Therapy Treatment Note/Progress Note  [address] [facility] [place] [address]      Patient: Franco Hernandez   : 1945  Diagnosis/ICD-10 Code:  S/P TKR (total knee replacement), left [Z96.652]  Referring practitioner: Alfredo Torres*  Date of Initial Visit: Type: THERAPY  Noted: 2025  Today's Date: 3/3/2025  Patient seen for 10 sessions           Visit Diagnoses:     ICD-10-CM ICD-9-CM   1. S/P TKR (total knee replacement), left  Z96.652 V43.65   2. Difficulty walking  R26.2 719.7     Subjective Questionnaire: LEFS 33/80 = 41.25% ability/58.75% limited    Subjective Franco Hernandez reports they put him on the lowest dose of Lisinopril and lowered the dose on his other BP med to 1/2 pill. Pt states he decided to only take 1/4 of a pill of the other med. Pt will follow up in another 6 weeks.     Pt feels his stamina is getting better as he was able to shop and check out without feeling dizzy. Pt states he was able to shave after getting out of the bath and feeling good. Pt is dressing ok and can get in/out of the car without much issue as long as there's not a car too close to the 's door, otherwise he won't be able to bend enough to get the leg/foot in.     Pt states his leg swelled up more yesterday and it's been more sore from that.     Pt with continued difficulty with: getting things off the floor, squatting, walking, standing, rolling in bed, stairs especially reduced depth steps into basement due to length of feet, or getting down on the floor is still limited.    Pain: avg 4    Objective     /75 mm/Hg,    /71 mm/Hg,   BP 93/47 mm/Hg,   after standing tasks; rested for several minutes to measure again before leaving  /58 mm/Hg, HR 89  (Target  is 85%, 126 is 90% of max)     Active Range of Motion   Left Knee   Flexion: 110 degrees   Extensor la degrees; AAROM 15 degrees with pulling    L hip flex 70 L/ R 95 (but  does have to lean back on hands to elevate this high)      Strength/Myotome Testing (in sitting, in available range)     Left Hip   Planes of Motion   Flexion: 2+     Right Hip   Planes of Motion   Flexion: 4- to 4     Left Knee   Flexion: 4-4+  Extension: 4- to 4    Gait I without AD today; antalgic; reduced TKE, hip flex/ext, HS/TO; reduced gt speed and step length     See Exercise, Manual, Modality, and/or Vestibular Logs for complete treatment.     Patient Education: cues for therex/theracts/NMR for form, reps, holds, and for avoiding compensation and pain; pt discussed contacting his heart doctor after the BP dropped again with standing tasks - suggested contacting either PCP and/or cardiologist regarding feeling of dizziness; reviewed exs as performed    Assessment/Plan Pt is reporting reduced pain and swelling overall, but some increased swelling yesterday possibly due to increased activity levels. Pt tolerated session fairly well with improved vitals, so progressed standing tasks as tolerated, but with resultant drop in BP again with pt having to sit. Pt rested until BP started to rise again.     Pt with improved mobility, strength and function since eval. Pt with continued difficulty with: getting things off the floor, squatting, walking, standing, rolling in bed, stairs especially reduced depth steps into basement due to length of feet, or getting down on the floor is still limited.    Pt has met 2 goals, partially met 1 goal, is progressing with 6 goals and has not met 1 goal. Current Goals/POC continue to be appropriate for this pt. Recommend continued skilled PT with current POC/interventions until goal attainment, I HEP and return to PLOF. Current POC expires 5/5/25.     Goals  Plan Goals: STGs in 4 weeks:  Decrease pain to 5-6/10 on average Met  Increase L knee AROM by at least 10 degrees where limited as much Met  Increase L hip flexion to LE strength to 3+/5 Not Met  Pt will demonstrate improved  gait quality and speed with least AD Progressing     LTGs by discharge  Increase L hip flex/knee flex/ext ROM to WFL/WNL Progressing  Increase L LE strength to 4+/5 Progressing  Pt will be able to ascend/descend stairs reciprocally with or without use of rail(s) and with minimal difficulty or pain Progressing - can go down curb I today  Pt will be able to sit/drive/ride for 30-60 mins without difficulty or pain Progressing  Pt will be able to stand and walk 30-60 mins for basic ADLs, grocery shopping, or house tasks without difficulty, pain or LOB Progressing  Pt will be able to wash/dress/groom without difficulty or increased pain Partially Met  Pt will be able to lift/carry laundry baskets, pots/pans, garbage or grocery bags without difficulty, LOB, or increased pain       Progress per Plan of Care and Progress strengthening /stabilization /functional activity            Timed:         Manual Therapy:         mins  81531;     Therapeutic Exercise:   14     mins  20281;     Neuromuscular Lourdes:   8      mins  73634;    Therapeutic Activity:    10     mins  99081;     Gait Training:           mins  76272;     Ultrasound:          mins  38681;    Ionto                                   mins   65825  Self Care                            mins   75736    Un-Timed:  Electrical Stimulation:         mins  25348 ( );  Traction          mins 86946  Canalith Repos                   mins  72899  Re-Eval                               mins  83022    Timed Treatment:   32   mins   Total Treatment:      32    mins          Ely Del Cid, PT    Physical Therapist

## 2025-03-03 NOTE — LETTER
Physical Therapy Treatment Note/Progress Note  [address] [facility] [place] [address]        Patient: Franco Hernandez   : 1945  Diagnosis/ICD-10 Code:  S/P TKR (total knee replacement), left [Z96.652]  Referring practitioner: Alfredo Torres*  Date of Initial Visit: Type: THERAPY  Noted: 2025  Today's Date: 3/3/2025  Patient seen for 10 sessions               Visit Diagnoses:   Visit Diagnosis       ICD-10-CM ICD-9-CM   1. S/P TKR (total knee replacement), left  Z96.652 V43.65   2. Difficulty walking  R26.2 719.7         Subjective Questionnaire: LEFS 33/80 = 41.25% ability/58.75% limited     Subjective Franco Hernandez reports they put him on the lowest dose of Lisinopril and lowered the dose on his other BP med to 1/2 pill. Pt states he decided to only take 1/4 of a pill of the other med. Pt will follow up in another 6 weeks.      Pt feels his stamina is getting better as he was able to shop and check out without feeling dizzy. Pt states he was able to shave after getting out of the bath and feeling good. Pt is dressing ok and can get in/out of the car without much issue as long as there's not a car too close to the 's door, otherwise he won't be able to bend enough to get the leg/foot in.      Pt states his leg swelled up more yesterday and it's been more sore from that.      Pt with continued difficulty with: getting things off the floor, squatting, walking, standing, rolling in bed, stairs especially reduced depth steps into basement due to length of feet, or getting down on the floor is still limited.     Pain: avg 4     Objective      /75 mm/Hg,    /71 mm/Hg,   BP 93/47 mm/Hg,   after standing tasks; rested for several minutes to measure again before leaving  /58 mm/Hg, HR 89  (Target  is 85%, 126 is 90% of max)      Active Range of Motion   Left Knee   Flexion: 110 degrees   Extensor la degrees; AAROM 15 degrees  with pulling     L hip flex 70 L/ R 95 (but does have to lean back on hands to elevate this high)      Strength/Myotome Testing (in sitting, in available range)     Left Hip   Planes of Motion   Flexion: 2+     Right Hip   Planes of Motion   Flexion: 4- to 4     Left Knee   Flexion: 4-4+  Extension: 4- to 4     Gait I without AD today; antalgic; reduced TKE, hip flex/ext, HS/TO; reduced gt speed and step length     See Exercise, Manual, Modality, and/or Vestibular Logs for complete treatment.      Patient Education: cues for therex/theracts/NMR for form, reps, holds, and for avoiding compensation and pain; pt discussed contacting his heart doctor after the BP dropped again with standing tasks - suggested contacting either PCP and/or cardiologist regarding feeling of dizziness; reviewed exs as performed     Assessment/Plan Pt is reporting reduced pain and swelling overall, but some increased swelling yesterday possibly due to increased activity levels. Pt tolerated session fairly well with improved vitals, so progressed standing tasks as tolerated, but with resultant drop in BP again with pt having to sit. Pt rested until BP started to rise again.      Pt with improved mobility, strength and function since eval. Pt with continued difficulty with: getting things off the floor, squatting, walking, standing, rolling in bed, stairs especially reduced depth steps into basement due to length of feet, or getting down on the floor is still limited.     Pt has met 2 goals, partially met 1 goal, is progressing with 6 goals and has not met 1 goal. Current Goals/POC continue to be appropriate for this pt. Recommend continued skilled PT with current POC/interventions until goal attainment, I HEP and return to PLOF. Current POC expires 5/5/25.      Goals  Plan Goals: STGs in 4 weeks:  Decrease pain to 5-6/10 on average Met  Increase L knee AROM by at least 10 degrees where limited as much Met  Increase L hip flexion to LE strength  to 3+/5 Not Met  Pt will demonstrate improved gait quality and speed with least AD Progressing     LTGs by discharge  Increase L hip flex/knee flex/ext ROM to WFL/WNL Progressing  Increase L LE strength to 4+/5 Progressing  Pt will be able to ascend/descend stairs reciprocally with or without use of rail(s) and with minimal difficulty or pain Progressing - can go down curb I today  Pt will be able to sit/drive/ride for 30-60 mins without difficulty or pain Progressing  Pt will be able to stand and walk 30-60 mins for basic ADLs, grocery shopping, or house tasks without difficulty, pain or LOB Progressing  Pt will be able to wash/dress/groom without difficulty or increased pain Partially Met  Pt will be able to lift/carry laundry baskets, pots/pans, garbage or grocery bags without difficulty, LOB, or increased pain        Progress per Plan of Care and Progress strengthening /stabilization /functional activity            Timed:                                                    Manual Therapy:                 mins  05166;                      Therapeutic Exercise:   14     mins  53812;     Neuromuscular Lourdes:   8      mins  47720;    Therapeutic Activity:     10     mins  70166;     Gait Training:                      mins  31533;     Ultrasound:                          mins  74644;    Ionto                                   mins   00226  Self Care                            mins   19820     Un-Timed:  Electrical Stimulation:         mins  02058 ( );  Traction                               mins 14765  Canalith Repos                   mins  66945  Re-Eval                               mins  69801     Timed Treatment:   32   mins   Total Treatment:      32    mins             Ely Del Cid, PT     Physical Therapist

## 2025-03-06 ENCOUNTER — TREATMENT (OUTPATIENT)
Dept: PHYSICAL THERAPY | Facility: CLINIC | Age: 80
End: 2025-03-06
Payer: MEDICARE

## 2025-03-06 DIAGNOSIS — Z96.652 S/P TKR (TOTAL KNEE REPLACEMENT), LEFT: Primary | ICD-10-CM

## 2025-03-06 DIAGNOSIS — R26.2 DIFFICULTY WALKING: ICD-10-CM

## 2025-03-06 NOTE — PROGRESS NOTES
"Physical Therapy Treatment Note  [facility]    [address] [place] [address]      Patient: Franco Hernandez   : 1945  Diagnosis/ICD-10 Code:  S/P TKR (total knee replacement), left [Z96.652]  Referring practitioner: Alfredo Torres*  Date of Initial Visit: Type: THERAPY  Noted: 2025  Today's Date: 3/6/2025  Patient seen for 11 sessions           Visit Diagnoses:     ICD-10-CM ICD-9-CM   1. S/P TKR (total knee replacement), left  Z96.652 V43.65   2. Difficulty walking  R26.2 719.7       Subjective Franco Hernandez reports he had to have a biopsy of the prostate yesterday and BP was low. Pt states he hasn't had his BP meds for 2 days. Pt states he did a lot of activity at home and his BP was up to 157 systolic. Pt states he sat and rested and it went back down to 132. Pt has an appt with the heart doctor until  and his kidney doctor around the same time. Pt states he hasn't had any episodes of light headedness even when he left after surgery and it went from 93 systolic to 116 systolic. Pt never received the dynasplint and never heard back from the rep.     Pt states he felt great for a short period of time and states he was lifting his L knee as high as the R, but then sat for 2 hours and could hardly walk when he got back up. Pt states he's been able to sleep for the last 2 night which seems to be helping. Pt states his biggest fear is of falling.     Pain: 1/10 twinge     Objective   /61 mm Hg, 112 HR (3' after walked into the exam room; had pt rest briefly before doing exs)  /56 mm Hg, 116 HR  /60 mm Hg, 123 HR  /59 mm Hg, 126 HR  /64 mm Hg before leaving    See Exercise, Manual, Modality, and/or Vestibular Logs for complete treatment.     Patient Education: cues for therex/theracts/NMR for form, reps, holds, and for avoiding compensation and pain    Assessment/Plan  Pt tolerated session fairly well, but did start to feel a little \"off\" after " standing for TKE, so deferred further standing tasks. Pt's cardiologist's office called while he was resting before leaving and told him to stop taking Lisinopril and to start taking only the Metoprolol. Pt took his pill as soon as he got off from the doctor's office, but noted that he'd already tried doing that. Pt was able to walk out to the vehicle with distant supervision today and did not need to hang onto the car to get down the curb. Pt has not been able to progress with more standing tasks due to dropping BP and elevated HR. Pt's next visit is prior to MD appt for the knee.       Progress per Plan of Care and Progress strengthening /stabilization /functional activity            Timed:         Manual Therapy:         mins  69992;     Therapeutic Exercise:   20      mins  04396;     Neuromuscular Lourdes:  10      mins  32796;    Therapeutic Activity:    10      mins  19095;     Gait Training:          mins  53791;     Ultrasound:          mins  45881;    Ionto                                   mins   09736  Self Care                            mins   94256    Un-Timed:  Electrical Stimulation:         mins  15734 ( );  Traction          mins 33019  Canalith Repos                   mins  18517  Re-Eval                               mins  59366    Timed Treatment:   40    mins   Total Treatment:     40   mins          Ely Del Cid, PT    Physical Therapist

## 2025-03-10 ENCOUNTER — TREATMENT (OUTPATIENT)
Dept: PHYSICAL THERAPY | Facility: CLINIC | Age: 80
End: 2025-03-10
Payer: MEDICARE

## 2025-03-10 DIAGNOSIS — Z96.652 S/P TKR (TOTAL KNEE REPLACEMENT), LEFT: Primary | ICD-10-CM

## 2025-03-10 DIAGNOSIS — R26.2 DIFFICULTY WALKING: ICD-10-CM

## 2025-03-10 PROCEDURE — 97110 THERAPEUTIC EXERCISES: CPT | Performed by: PHYSICAL THERAPIST

## 2025-03-10 NOTE — PROGRESS NOTES
"Physical Therapy Treatment Note  [address] [place] [address]      Patient: Franco Hernandez   : 1945  Diagnosis/ICD-10 Code:  S/P TKR (total knee replacement), left [Z96.652]  Referring practitioner: Alfredo Torres*  Date of Initial Visit: Type: THERAPY  Noted: 2025  Today's Date: 3/10/2025  Patient seen for 12 sessions           Visit Diagnoses:     ICD-10-CM ICD-9-CM   1. S/P TKR (total knee replacement), left  Z96.652 V43.65   2. Difficulty walking  R26.2 719.7       Subjective Franco Hernandez reports he did a lot yesterday with cleaning the cat litter and carrying ~50# bag of cat litter in garbage bag up the stairs, and shopping with lifting two 24 packs of water bottles. Pt states he sees both the cardiologist and goes for knee follow-up as well today.      Objective   Vitals:   Pre Rx: /53 mm Hg;   Mid session: /70 mm Hg; HR 96  End of session 124/61 mm Hg; HR 96    AAROM with quad set in sitting off edge of bed with knee extended and heel on floor:  knee ext lag 13 degrees at best today; AAROM knee flex 118 degrees    Gait: I without AD, antalgic with significant limitation in TKE with walking in clinic after sitting, much improved with walking out, but still lacking TKE    See Exercise, Manual, Modality, and/or Vestibular Logs for complete treatment.     Patient Education: cues for therex/theracts/NMR for form, reps, holds, and for avoiding compensation and pain    Assessment/Plan Deferred standing tasks and stabilization tasks today to focus mostly on ROM as pt will have to go into 2 more appts today (ortho and cardio). Pt continues to have unregulated BP during sessions with continued, but less frequent reports of feeling \"off\" when BP drops. Pt has just recently dropped the Lisinopril and is only taking Metoprolol now (pt notes per cardiologist). Progress with knee ext has been slow due to difficulties with BP and pt unable to lay down " in supine.     Pt has 1 more authorized visit left, then we will need to get further authorization to continue PT.     Progress per Plan of Care and Progress strengthening /stabilization /functional activity            Timed:         Manual Therapy:   5      mins  41375;     Therapeutic Exercise:   25      mins  60482;     Neuromuscular Lourdes:        mins  86926;    Therapeutic Activity:          mins  02024;     Gait Training:           mins  81101;     Ultrasound:          mins  18691;    Ionto                                   mins   36840  Self Care                            mins   98768    Un-Timed:  Electrical Stimulation:         mins  44065 ( );  Traction          mins 23552  Canalith Repos                   mins  44231  Re-Eval                               mins  51144    Timed Treatment:  30    mins   Total Treatment:    30   mins          Ely Del Cid, PT    Physical Therapist

## 2025-03-10 NOTE — LETTER
"  RE: updated measurements    Physical Therapy Treatment Note  [id number] [facility], [place] [address]        Patient: Franco Hernandez   : 1945  Diagnosis/ICD-10 Code:  S/P TKR (total knee replacement), left [Z96.652]  Referring practitioner: Alfredo Torres*  Date of Initial Visit: Type: THERAPY  Noted: 2025  Today's Date: 3/10/2025  Patient seen for 12 sessions               Visit Diagnoses:   Visit Diagnosis       ICD-10-CM ICD-9-CM   1. S/P TKR (total knee replacement), left  Z96.652 V43.65   2. Difficulty walking  R26.2 719.7            Subjective Franco Hernandez reports he did a lot yesterday with cleaning the cat litter and carrying ~50# bag of cat litter in garbage bag up the stairs, and shopping with lifting two 24 packs of water bottles. Pt states he sees both the cardiologist and goes for knee follow-up as well today.       Objective   Vitals:   Pre Rx: /53 mm Hg;   Mid session: /70 mm Hg; HR 96  End of session 124/61 mm Hg; HR 96     AAROM with quad set in sitting off edge of bed with knee extended and heel on floor:  knee ext lag 13 degrees at best today; AAROM knee flex 118 degrees     Gait: I without AD, antalgic with significant limitation in TKE with walking in clinic after sitting, much improved with walking out, but still lacking TKE     See Exercise, Manual, Modality, and/or Vestibular Logs for complete treatment.      Patient Education: cues for therex/theracts/NMR for form, reps, holds, and for avoiding compensation and pain     Assessment/Plan Deferred standing tasks and stabilization tasks today to focus mostly on ROM as pt will have to go into 2 more appts today (ortho and cardio). Pt continues to have unregulated BP during sessions with continued, but less frequent reports of feeling \"off\" when BP drops. Pt has just recently dropped the Lisinopril and is only taking Metoprolol now (pt notes per cardiologist). Progress with knee ext " has been slow due to difficulties with BP and pt unable to lay down in supine.      Pt has 1 more authorized visit left, then we will need to get further authorization to continue PT.      Progress per Plan of Care and Progress strengthening /stabilization /functional activity            Timed:                                                    Manual Therapy:           5      mins  07680;                    Therapeutic Exercise:   25      mins  14490;     Neuromuscular Lourdes:        mins  07067;    Therapeutic Activity:           mins  06640;     Gait Training:                      mins  25235;     Ultrasound:                          mins  62035;    Ionto                                   mins   91606  Self Care                            mins   31849     Un-Timed:  Electrical Stimulation:         mins  44702 ( );  Traction                               mins 86428  Canalith Repos                   mins  49750  Re-Eval                               mins  24724     Timed Treatment:  30    mins   Total Treatment:    30   mins             Ely Del Cid, PT     Physical Therapist

## 2025-03-13 ENCOUNTER — TREATMENT (OUTPATIENT)
Dept: PHYSICAL THERAPY | Facility: CLINIC | Age: 80
End: 2025-03-13
Payer: MEDICARE

## 2025-03-13 DIAGNOSIS — Z96.652 S/P TKR (TOTAL KNEE REPLACEMENT), LEFT: Primary | ICD-10-CM

## 2025-03-13 DIAGNOSIS — R26.2 DIFFICULTY WALKING: ICD-10-CM

## 2025-03-13 NOTE — PROGRESS NOTES
Physical Therapy Treatment Note  [address] [place] [address]      Patient: Franco Hernandez   : 1945  Diagnosis/ICD-10 Code:  S/P TKR (total knee replacement), left [Z96.652]  Referring practitioner: Alfredo Torres*  Date of Initial Visit: Type: THERAPY  Noted: 2025  Today's Date: 3/13/2025  Patient seen for 13 sessions           Visit Diagnoses:     ICD-10-CM ICD-9-CM   1. S/P TKR (total knee replacement), left  Z96.652 V43.65   2. Difficulty walking  R26.2 719.7       Subjective Franco Hernandez reports he went for follow up and they wanted his knee to be straighter. He told them the jair splint never came and he wanted them to cancel it. Pt has a follow up in 3 months to see the doctor. Pt's heart doctor said not to take the Metoprolol if the BP is low and take it if it's higher. Pt has stopped taking Ozempic which is helping. Pt states the cardiologist said he can safely ex with HR limited to130 at the most.     Objective   /64, HR 95 pre   /65,  mid  /57,  mid after standing tasks  /72, HR 87 end after 1-2' rest    See Exercise, Manual, Modality, and/or Vestibular Logs for complete treatment.     Patient Education: cues for therex/theracts/NMR for form, reps, holds, and for avoiding compensation and pain    Assessment/Plan Pt did not feel lightheaded today at all during session and was able to do more standing tasks than he has in the past. However, BP was elevated to 158 bpm afterwards. Pt did not believe it was that high because it went down so quickly to 87 bpm in a couple minutes.    Added back form steps and form walking at the [facility]. Pt with some improved gait quality afterwards. Continue to progress as tolerated with standing, more advanced tasks. Continue to push knee ext.       Progress per Plan of Care and Progress strengthening /stabilization /functional activity            Timed:         Manual Therapy:   8       mins  59800;     Therapeutic Exercise:   20      mins  65245;     Neuromuscular Lourdes:  9      mins  16149;    Therapeutic Activity:   11     mins  57040;     Gait Training:           mins  84240;     Ultrasound:          mins  38680;    Ionto                                   mins   94038  Self Care                            mins   87169    Un-Timed:  Electrical Stimulation:         mins  09311 ( );  Traction          mins 05426  Canalith Repos                   mins  01289  Re-Eval                               mins  81730    Timed Treatment:   48   mins   Total Treatment:      48  mins          Ely Del Cid, PT    Physical Therapist

## 2025-03-18 ENCOUNTER — TREATMENT (OUTPATIENT)
Dept: PHYSICAL THERAPY | Facility: CLINIC | Age: 80
End: 2025-03-18
Payer: MEDICARE

## 2025-03-18 DIAGNOSIS — Z96.652 S/P TKR (TOTAL KNEE REPLACEMENT), LEFT: Primary | ICD-10-CM

## 2025-03-18 DIAGNOSIS — R26.2 DIFFICULTY WALKING: ICD-10-CM

## 2025-03-18 NOTE — PROGRESS NOTES
Physical Therapy Treatment Note  [place]    [address] [facility], [place] [address]      Patient: Franco Hernandez   : 1945  Diagnosis/ICD-10 Code:  S/P TKR (total knee replacement), left [Z96.652]  Referring practitioner: Aflredo Torres*  Date of Initial Visit: Type: THERAPY  Noted: 2025  Today's Date: 3/18/2025  Patient seen for 14 sessions           Visit Diagnoses:     ICD-10-CM ICD-9-CM   1. S/P TKR (total knee replacement), left  Z96.652 V43.65   2. Difficulty walking  R26.2 719.7       Subjective Franco Hernandez reports he saw the urologist and does not have cancer. Pt states his BP was 116/62. Pt is starting to feel better. Pt wants to try to play golf, but is afraid if he goes and can't tolerate it, that it could mess up other people's game. Pt states the only way to know if he'll do alright is if he tries it. Pt has been feeling better overall. Just a little light headed after getting out of the bath tub so will sit for a minute if needed. He feels the rest of the progress is on him and notes he's not as concerned with how much his knee straightens as the doctor is. Pt states neither of his knees have straightened out completely for years and years.     Objective     See Exercise, Manual, Modality, and/or Vestibular Logs for complete treatment.     Patient Education: cues for therex/theracts/NMR for form, reps, holds, and for avoiding compensation and pain    Assessment/Plan  Discussed that pt has 4 more authorized visits left. He wants to think about if he wants to use all of the visits right away, or if he wants to try HEP and some golfing first and hold a few visits for prn. Pt will let us know at next visit. Pt with more normalized BP. The highest systolic reading was 124 and the lowest was 111. The highest diastolic was 66 and the lowest diastolic was 56. HR remained in the 90s with activity. Pt did not report any feeling of faintness or dizziness during this session and  was able to exit without PT A or S.      Progress per Plan of Care and Progress strengthening /stabilization /functional activity            Timed:         Manual Therapy:         mins  93767;     Therapeutic Exercise:   20      mins  15295;     Neuromuscular Lourdes:  12      mins  26756;    Therapeutic Activity:     8     mins  83670;     Gait Training:           mins  89565;     Ultrasound:          mins  58418;    Ionto                                   mins   15807  Self Care                            mins   71661    Un-Timed:  Electrical Stimulation:         mins  65084 ( );  Traction          mins 55029  Canalith Repos                   mins  49177  Re-Eval                               mins  78086    Timed Treatment:   40   mins   Total Treatment:     40   mins          Ely Del Cid, PT    Physical Therapist

## 2025-03-20 ENCOUNTER — TREATMENT (OUTPATIENT)
Dept: PHYSICAL THERAPY | Facility: CLINIC | Age: 80
End: 2025-03-20
Payer: MEDICARE

## 2025-03-20 DIAGNOSIS — Z96.652 S/P TKR (TOTAL KNEE REPLACEMENT), LEFT: Primary | ICD-10-CM

## 2025-03-20 DIAGNOSIS — R26.2 DIFFICULTY WALKING: ICD-10-CM

## 2025-03-20 NOTE — PROGRESS NOTES
Physical Therapy Treatment Note  [address] [facility] [place] [address]      Patient: Franco Hernandez   : 1945  Diagnosis/ICD-10 Code:  S/P TKR (total knee replacement), left [Z96.652]  Referring practitioner: Alfredo Torres*  Date of Initial Visit: Type: THERAPY  Noted: 2025  Today's Date: 3/20/2025  Patient seen for 15 sessions           Visit Diagnoses:     ICD-10-CM ICD-9-CM   1. S/P TKR (total knee replacement), left  Z96.652 V43.65   2. Difficulty walking  R26.2 719.7       Subjective Franco Hernandez reports no pain at present. Pt states it's usually sore in the morning when he gets up. Pt shot a bucket of golf balls even with a  and states he was hitting almost as good as he ever did. Pt states he feels ready to get back to golfing. Pt states he hasn't had any of the dizziness or lightheadedness even with bending to get his gol and his BP seems more regulated.     Objective   /74, HR 78 Pre Rx  /71, HR 86     L knee 0-  Hip flex 4    See Exercise, Manual, Modality, and/or Vestibular Logs for complete treatment.     Patient Education: cues for therex/theracts/NMR for form, reps, holds, and for avoiding compensation and pain; reviewed exs as performed; discussed again that pt has a few visits left if he'd like me to keep the chart open in case he finds out he is limited with anything else he needs to work on; discussed leaving the POC open until  when the authorization expires just in case pt needs to get back to PT    Assessment/Plan  Pt tolerated session well. Pt's BP did drop as noted above, but pt no longer feels lightheaded or feels like he will pass out. Pt would like to hold PT to try I HEP and return to his golfing. Pt will call us if he needs to return and was informed that if he doesn't return by 25, we will discharge him then.     Progress per Plan of Care and Progress strengthening /stabilization /functional activity             Timed:         Manual Therapy:         mins  14305;     Therapeutic Exercise:   22      mins  00067;     Neuromuscular Lourdes:  10     mins  03756;    Therapeutic Activity:    25     mins  44638;     Gait Training:           mins  80022;     Ultrasound:          mins  20033;    Ionto                                   mins   21494  Self Care                            mins   96889    Un-Timed:  Electrical Stimulation:         mins  10096 ( );  Traction          mins 79379  Canalith Repos                   mins  09624  Re-Eval                               mins  22414    Timed Treatment:   57   mins   Total Treatment:     57   mins          Ely Del Cid, PT    Physical Therapist

## 2025-04-15 ENCOUNTER — TREATMENT (OUTPATIENT)
Dept: PHYSICAL THERAPY | Facility: CLINIC | Age: 80
End: 2025-04-15
Payer: MEDICARE

## 2025-04-15 DIAGNOSIS — R26.2 DIFFICULTY WALKING: ICD-10-CM

## 2025-04-15 DIAGNOSIS — Z96.652 S/P TKR (TOTAL KNEE REPLACEMENT), LEFT: Primary | ICD-10-CM

## 2025-04-15 NOTE — PROGRESS NOTES
Physical Therapy Daily Treatment Note/Progress Note  [address] [place] [address]    Patient: Franco Hernandez  : 1945  Referring practitioner: Alfredo Torres*  Date of Initial Visit: Type: THERAPY  Noted: 2025  Today's Date: 4/15/2025  Patient seen for 16 sessions      Visit Diagnoses:    ICD-10-CM ICD-9-CM   1. S/P TKR (total knee replacement), left  Z96.652 V43.65   2. Difficulty walking  R26.2 719.7       VISIT#: 16    Subjective   Franco Hernandez reports to physical therapy for the first time in 4 weeks.  He is currently 11 weeks and 5 days post-op L total knee replacement on 25. He states that he has been able to play golf 4x since his last PT visit.  When he sits down, he notices that his knee gets tight again.  He states that his pain is minimal but he questions the continued stiffness in his knees.  He hasn't been performing his exercises consistently but he is pleased with his progress to date.  Riding as a passenger places his knee in an uncomfortable flexed position but he is better when he is the .   Pain Rating (0-10): 0/10 points   Lower Extremity Functional Scale: 44/80 points (previously  33/80 points)    Objective          Static Posture     Knee   Knee (Left): Flexed.     Observations     Additional Knee Observation Details  Well healing scar L anterior knee    Palpation   Left   Tenderness of the distal biceps femoris, distal semimembranosus, distal semitendinosus, lateral gastrocnemius, medial gastrocnemius and vastus medialis.     Neurological Testing     Sensation     Knee   Left Knee   Diminished: Light touch     Active Range of Motion   Left Knee   Flexion: 110 degrees   Extensor la degrees     Passive Range of Motion   Left Knee   Flexion: 117 degrees   Extension: 10 degrees     Patellar Mobility   Left Knee Patellar tendons within functional limits include the medial, lateral, superior and inferior.     Strength/Myotome  Testing     Left Hip   Planes of Motion   Flexion: 4+  Extension: 4-  Abduction: 4+  Adduction: 5    Left Knee   Flexion: 5  Extension: 5  Quadriceps contraction: good    Left Ankle/Foot   Dorsiflexion: 4+  Plantar flexion: 3+    Ambulation   Weight-Bearing Status   Weight-Bearing Status (Left): weight-bearing as tolerated   Assistive device used: none    Observational Gait   Gait: asymmetric   Decreased walking speed, stride length and left stance time.     Quality of Movement During Gait     Knee    Knee (Left): Positive stiff knee.         See Exercise, Manual, and Modality Logs for complete treatment.     Patient Education: Reviewed normal tissue healing and progression of exercises for further ROM and strengthening    Assessment & Plan       Assessment  Impairments: abnormal or restricted ROM, activity intolerance, impaired balance, impaired physical strength, pain with function and weight-bearing intolerance   Functional limitations: walking, uncomfortable because of pain, stooping and unable to perform repetitive tasks   Assessment details: Patient presented to outpatient physical therapy with a referral for treatment following his L total knee replacement/revision on 1/23/25.  He has been seen for a total of 16 visits to date and is making great progress in physical therapy.  He has been able to resume playing golf for fitness and recreation.  He continues with mild knee stiffness and loss of end range flexion and extension but has demonstrated improvement overall.  He has demonstrated further gains in LE strength, balance, and gait.   He has a comprehensive exercise program for home use but reports minimal completion of home exercises at this time.   Patient may do well to continue on his own at this time but could also use his last 2 remaining PT visits that have been approved with a focus on regaining full terminal knee extension and increased knee flexion.     Goals  Plan Goals: STGs in 4  weeks:  Decrease pain to 5-6/10 on average - MET  Increase L knee AROM by at least 10 degrees where limited as much -MET  Increase L hip flexion to LE strength to 3+/5 - MET  Pt will demonstrate improved gait quality and speed with least AD - MET     LTGs by discharge  Increase L hip flex/knee flex/ext ROM to WFL/WNL -PARTIALLY MET  Increase L LE strength to 4+/5  -PARTIALLY MET  Pt will be able to ascend/descend stairs reciprocally with or without use of rail(s) and with minimal difficulty or pain  -PARTIALLY MET  Pt will be able to sit/drive/ride for 30-60 mins without difficulty or pain - MET  Pt will be able to stand and walk 30-60 mins for basic ADLs, grocery shopping, or house tasks without difficulty, pain or LOB  -PARTIALLY MET  Pt will be able to wash/dress/groom without difficulty or increased pain -PARTIALLY MET  Pt will be able to lift/carry laundry baskets, pots/pans, garbage or grocery bags without difficulty, LOB, or increased pain  -PARTIALLY MET    Plan  Therapy options: will be seen for skilled therapy services  Planned modality interventions: thermotherapy (hydrocollator packs) and cryotherapy  Planned therapy interventions: abdominal trunk stabilization, balance/weight-bearing training, body mechanics training, flexibility, functional ROM exercises, gait training, home exercise program, joint mobilization, manual therapy, neuromuscular re-education, postural training, soft tissue mobilization, strengthening, stretching and therapeutic activities  Frequency: 1-2X/WK.  Duration in weeks: 4              Timed:         Manual Therapy:    12     mins  15816;     Therapeutic Exercise:    16     mins  40595;     Neuromuscular Lourdes:        mins  13102;    Therapeutic Activity:     10     mins  32364;     Gait Training:           mins  80178;     Ultrasound:          mins  09802;    Ionto                                   mins   74184  Self Care                            mins    85276    Un-Timed:  Electrical Stimulation:         mins  88346 ( );  Traction          mins 59187  Re-Eval                           10    mins  45501    No Charge:   Cryopack                         mins  Moist Heat                       mins    Timed Treatment:  38   mins   Total Treatment:     48   mins        Natalie Amezcua PT  Physical Therapist  [place] License: [id number]

## 2025-05-16 ENCOUNTER — DOCUMENTATION (OUTPATIENT)
Dept: PHYSICAL THERAPY | Facility: CLINIC | Age: 80
End: 2025-05-16
Payer: MEDICARE

## 2025-05-16 NOTE — PROGRESS NOTES
Physical Therapy Discharge Summary  [address] [place] [address]    Patient: Franco Hernandez  : 1945  Referring practitioner: Alfredo Torres*  Date of Initial Visit: Type: THERAPY  Noted: 2025  Last Visit Date: 4/15/2025  Patient seen for 16 sessions    Discharge Status of Patient: Pt still had some authorized visits left, but did not return and POC has .      Goals  Plan Goals: STGs in 4 weeks:  Decrease pain to 5-6/10 on average - MET  Increase L knee AROM by at least 10 degrees where limited as much -MET  Increase L hip flexion to LE strength to 3+/5 - MET  Pt will demonstrate improved gait quality and speed with least AD - MET     LTGs by discharge  Increase L hip flex/knee flex/ext ROM to WFL/WNL -PARTIALLY MET  Increase L LE strength to 4+/5  -PARTIALLY MET  Pt will be able to ascend/descend stairs reciprocally with or without use of rail(s) and with minimal difficulty or pain  -PARTIALLY MET  Pt will be able to sit/drive/ride for 30-60 mins without difficulty or pain - MET  Pt will be able to stand and walk 30-60 mins for basic ADLs, grocery shopping, or house tasks without difficulty, pain or LOB  -PARTIALLY MET  Pt will be able to wash/dress/groom without difficulty or increased pain -PARTIALLY MET  Pt will be able to lift/carry laundry baskets, pots/pans, garbage or grocery bags without difficulty, LOB, or increased pain  -PARTIALLY MET        Discharge Plan: Continue with current home exercise program as instructed     Comments: Pt was given HEP during sessions.      Date of Discharge: 25            Ely Del Cid, PT  Physical Therapist  [place] License: [id number]

## (undated) DEVICE — PICO 7 10CM X 30CM: Brand: PICO™ 7

## (undated) DEVICE — PREP SOL POVIDONE/IODINE BT 4OZ

## (undated) DEVICE — STPLR SKIN VISISTAT WD 35CT

## (undated) DEVICE — 3M™ IOBAN™ 2 ANTIMICROBIAL INCISE DRAPE 6650EZ: Brand: IOBAN™ 2

## (undated) DEVICE — PK KN TOTL 40

## (undated) DEVICE — GLV SURG SIGNATURE ESSENTIAL PF LTX SZ8.5

## (undated) DEVICE — DUAL CUT SAGITTAL BLADE

## (undated) DEVICE — REAL INTELLIGENCE 5  MM                                    CYLINDRICAL BUR: Brand: REAL INTELLIGENCE

## (undated) DEVICE — SYR LUERLOK 30CC

## (undated) DEVICE — CEMENT MIXING SYSTEM WITH MIS FEMORAL BREAKAWAY NOZZLE: Brand: REVOLUTION

## (undated) DEVICE — ADHS LIQ MASTISOL 2/3ML

## (undated) DEVICE — TRAP FLD MINIVAC MEGADYNE 100ML

## (undated) DEVICE — 450 ML BOTTLE OF 0.05% CHLORHEXIDINE GLUCONATE IN 99.95% STERILE WATER FOR IRRIGATION, USP AND APPLICATOR.: Brand: IRRISEPT ANTIMICROBIAL WOUND LAVAGE

## (undated) DEVICE — 3M™ IOBAN™ 2 ANTIMICROBIAL INCISE DRAPE 6651EZ: Brand: IOBAN™ 2

## (undated) DEVICE — CEMENT MIXING SYSTEM WITH FEMORAL BREAKWAY NOZZLE: Brand: REVOLUTION

## (undated) DEVICE — PREP IM ENCHANCED TOTAL HIP BONE                                    PREPARATION KIT: Brand: PREP-IM

## (undated) DEVICE — DECANTER BAG 9": Brand: MEDLINE INDUSTRIES, INC.

## (undated) DEVICE — SUT VIC 2/0 CT1 36IN

## (undated) DEVICE — NAVIO FLAT MARKERS: Brand: NAVIO

## (undated) DEVICE — DRSNG BURN ACTICOAT FLEX 7 1X24IN

## (undated) DEVICE — PATIENT RETURN ELECTRODE, SINGLE-USE, CONTACT QUALITY MONITORING, ADULT, WITH 9FT CORD, FOR PATIENTS WEIGING OVER 33LBS. (15KG): Brand: MEGADYNE

## (undated) DEVICE — NON RIMMED SPEED PIN 65MM STERILE

## (undated) DEVICE — THE STERILE LIGHT HANDLE COVER IS USED WITH STERIS SURGICAL LIGHTING AND VISUALIZATION SYSTEMS.

## (undated) DEVICE — SOL ISO/ALC 70PCT 4OZ

## (undated) DEVICE — SUT ETHLN 2/0 PS 18IN 585H

## (undated) DEVICE — SUCTION MAT (LOW PROFILE), 50X34: Brand: NEPTUNE

## (undated) DEVICE — NEEDLE, QUINCKE, 20GX3.5": Brand: MEDLINE

## (undated) DEVICE — SUT ETHIB 2 CV V37 MS/4 30IN MX69G

## (undated) DEVICE — PENCL SMOKE/EVAC MEGADYNE TELESCP 10FT

## (undated) DEVICE — GLV SURG PREMIERPRO ORTHO LTX PF SZ8.5 BRN

## (undated) DEVICE — MIS 3.2MM X 65MM RIMMED PIN STERILE: Brand: HARMONY

## (undated) DEVICE — CONTAINER,SPECIMEN,OR STERILE,4OZ: Brand: MEDLINE

## (undated) DEVICE — SOL NACL 0.9PCT 1000ML

## (undated) DEVICE — COVER,MAYO STAND,STERILE: Brand: MEDLINE

## (undated) DEVICE — BNDG,ELSTC,MATRIX,STRL,6"X5YD,LF,HOOK&LP: Brand: MEDLINE

## (undated) DEVICE — APPL CHLORAPREP HI/LITE 26ML ORNG

## (undated) DEVICE — DRSNG TELFA PAD NONADH STR 1S 3X4IN